# Patient Record
Sex: FEMALE | Race: WHITE | NOT HISPANIC OR LATINO | Employment: OTHER | URBAN - METROPOLITAN AREA
[De-identification: names, ages, dates, MRNs, and addresses within clinical notes are randomized per-mention and may not be internally consistent; named-entity substitution may affect disease eponyms.]

---

## 2017-08-12 ENCOUNTER — PATIENT MESSAGE (OUTPATIENT)
Dept: FAMILY MEDICINE | Facility: CLINIC | Age: 62
End: 2017-08-12

## 2017-10-04 ENCOUNTER — HOSPITAL ENCOUNTER (OUTPATIENT)
Dept: RADIOLOGY | Facility: HOSPITAL | Age: 62
Discharge: HOME | End: 2017-10-04
Attending: OBSTETRICS & GYNECOLOGY
Payer: COMMERCIAL

## 2017-10-04 VITALS — HEIGHT: 63 IN | BODY MASS INDEX: 27.82 KG/M2 | WEIGHT: 157 LBS

## 2017-10-04 DIAGNOSIS — Z12.31 ENCOUNTER FOR SCREENING MAMMOGRAM FOR MALIGNANT NEOPLASM OF BREAST: ICD-10-CM

## 2017-10-04 PROCEDURE — 77063 BREAST TOMOSYNTHESIS BI: CPT | Mod: 26,,, | Performed by: RADIOLOGY

## 2017-10-04 PROCEDURE — 77067 SCR MAMMO BI INCL CAD: CPT | Mod: 26,,, | Performed by: RADIOLOGY

## 2017-10-04 PROCEDURE — 77067 SCR MAMMO BI INCL CAD: CPT | Mod: TC

## 2017-10-10 ENCOUNTER — HOSPITAL ENCOUNTER (OUTPATIENT)
Dept: RADIOLOGY | Facility: HOSPITAL | Age: 62
Discharge: HOME | End: 2017-10-10
Attending: OBSTETRICS & GYNECOLOGY
Payer: COMMERCIAL

## 2017-10-10 DIAGNOSIS — N60.19 FIBROCYSTIC DISEASE OF BREAST: ICD-10-CM

## 2017-10-10 DIAGNOSIS — N63.20 LUMP OF BREAST, LEFT: ICD-10-CM

## 2017-10-10 PROCEDURE — 76642 ULTRASOUND BREAST LIMITED: CPT | Mod: TC,PO,LT

## 2017-10-10 PROCEDURE — 77061 BREAST TOMOSYNTHESIS UNI: CPT | Mod: 26,LT,, | Performed by: RADIOLOGY

## 2017-10-10 PROCEDURE — 77065 DX MAMMO INCL CAD UNI: CPT | Mod: 26,LT,, | Performed by: RADIOLOGY

## 2017-10-10 PROCEDURE — 77061 BREAST TOMOSYNTHESIS UNI: CPT | Mod: TC,LT

## 2017-10-10 PROCEDURE — 76642 ULTRASOUND BREAST LIMITED: CPT | Mod: 26,LT,, | Performed by: RADIOLOGY

## 2017-10-10 PROCEDURE — 77065 DX MAMMO INCL CAD UNI: CPT | Mod: TC,LT

## 2018-04-18 ENCOUNTER — HOSPITAL ENCOUNTER (OUTPATIENT)
Dept: RADIOLOGY | Facility: HOSPITAL | Age: 63
Discharge: HOME | End: 2018-04-18
Attending: SURGERY
Payer: COMMERCIAL

## 2018-04-18 DIAGNOSIS — N63.0 LUMP OR MASS IN BREAST: ICD-10-CM

## 2018-04-18 PROCEDURE — 77061 BREAST TOMOSYNTHESIS UNI: CPT | Mod: 26,LT,, | Performed by: RADIOLOGY

## 2018-04-18 PROCEDURE — 77061 BREAST TOMOSYNTHESIS UNI: CPT | Mod: TC,PO,LT

## 2018-04-18 PROCEDURE — 77065 DX MAMMO INCL CAD UNI: CPT | Mod: 26,LT,, | Performed by: RADIOLOGY

## 2018-04-18 PROCEDURE — 76642 ULTRASOUND BREAST LIMITED: CPT | Mod: TC,PO,LT

## 2018-04-18 PROCEDURE — 76642 ULTRASOUND BREAST LIMITED: CPT | Mod: 26,LT,, | Performed by: RADIOLOGY

## 2018-04-18 PROCEDURE — 77065 DX MAMMO INCL CAD UNI: CPT | Mod: TC,PO,LT

## 2018-10-11 ENCOUNTER — HOSPITAL ENCOUNTER (OUTPATIENT)
Dept: RADIOLOGY | Facility: HOSPITAL | Age: 63
Discharge: HOME | End: 2018-10-11
Attending: SURGERY
Payer: COMMERCIAL

## 2018-10-11 VITALS — WEIGHT: 157 LBS | BODY MASS INDEX: 27.81 KG/M2

## 2018-10-11 DIAGNOSIS — R92.8 ABNORMAL MAMMOGRAM: ICD-10-CM

## 2018-10-11 DIAGNOSIS — Z12.31 SCREENING MAMMOGRAM, ENCOUNTER FOR: ICD-10-CM

## 2018-10-11 PROCEDURE — 77062 BREAST TOMOSYNTHESIS BI: CPT | Mod: 26,,, | Performed by: RADIOLOGY

## 2018-10-11 PROCEDURE — 77062 BREAST TOMOSYNTHESIS BI: CPT | Mod: TC,PO

## 2018-10-11 PROCEDURE — 76642 ULTRASOUND BREAST LIMITED: CPT | Mod: 26,LT,, | Performed by: RADIOLOGY

## 2018-10-11 PROCEDURE — 76642 ULTRASOUND BREAST LIMITED: CPT | Mod: TC,PO,LT

## 2018-10-11 PROCEDURE — 77066 DX MAMMO INCL CAD BI: CPT | Mod: 26,,, | Performed by: RADIOLOGY

## 2018-12-18 PROBLEM — R07.9 CHEST PAIN: Status: ACTIVE | Noted: 2018-12-18

## 2018-12-18 PROBLEM — R79.89 ELEVATED TROPONIN: Status: ACTIVE | Noted: 2018-12-18

## 2018-12-18 PROBLEM — I24.9 ACUTE CORONARY SYNDROME: Status: ACTIVE | Noted: 2018-12-18

## 2018-12-19 PROBLEM — I21.4 NSTEMI (NON-ST ELEVATED MYOCARDIAL INFARCTION): Status: ACTIVE | Noted: 2018-12-19

## 2018-12-19 PROBLEM — I25.10 CORONARY ARTERY DISEASE INVOLVING NATIVE CORONARY ARTERY: Status: ACTIVE | Noted: 2018-12-19

## 2018-12-31 PROBLEM — N94.89 PELVIC HEMATOMA IN FEMALE: Status: ACTIVE | Noted: 2018-12-31

## 2019-02-14 PROBLEM — I25.84 CORONARY ARTERY DISEASE DUE TO CALCIFIED CORONARY LESION: Status: ACTIVE | Noted: 2018-12-19

## 2019-08-14 PROBLEM — I24.9 ACUTE CORONARY SYNDROME: Status: RESOLVED | Noted: 2018-12-18 | Resolved: 2019-08-14

## 2019-10-29 ENCOUNTER — HOSPITAL ENCOUNTER (OUTPATIENT)
Dept: RADIOLOGY | Facility: HOSPITAL | Age: 64
Discharge: HOME | End: 2019-10-29
Attending: SURGERY
Payer: COMMERCIAL

## 2019-10-29 DIAGNOSIS — Z12.31 BREAST CANCER SCREENING BY MAMMOGRAM: ICD-10-CM

## 2019-10-29 DIAGNOSIS — Z12.39 ENCOUNTER FOR SPECIAL SCREENING EXAMINATION FOR NEOPLASM OF BREAST: ICD-10-CM

## 2019-10-29 PROCEDURE — 77067 SCR MAMMO BI INCL CAD: CPT | Mod: 26,,, | Performed by: RADIOLOGY

## 2019-10-29 PROCEDURE — 77063 BREAST TOMOSYNTHESIS BI: CPT | Mod: TC,PO

## 2019-10-29 PROCEDURE — 77063 BREAST TOMOSYNTHESIS BI: CPT | Mod: 26,,, | Performed by: RADIOLOGY

## 2019-10-29 PROCEDURE — 77067 MAMMO DIGITAL SCREENING BILAT WITH TOMOSYNTHESIS_CAD: ICD-10-PCS | Mod: 26,,, | Performed by: RADIOLOGY

## 2019-10-29 PROCEDURE — 77063 MAMMO DIGITAL SCREENING BILAT WITH TOMOSYNTHESIS_CAD: ICD-10-PCS | Mod: 26,,, | Performed by: RADIOLOGY

## 2020-01-03 PROBLEM — R06.02 SOB (SHORTNESS OF BREATH): Status: ACTIVE | Noted: 2020-01-03

## 2020-01-03 PROBLEM — R94.39 ABNORMAL STRESS TEST: Status: ACTIVE | Noted: 2020-01-03

## 2020-10-07 ENCOUNTER — OFFICE VISIT (OUTPATIENT)
Dept: FAMILY MEDICINE | Facility: CLINIC | Age: 65
End: 2020-10-07
Payer: MEDICARE

## 2020-10-07 VITALS
BODY MASS INDEX: 27.07 KG/M2 | SYSTOLIC BLOOD PRESSURE: 118 MMHG | HEART RATE: 79 BPM | TEMPERATURE: 98 F | OXYGEN SATURATION: 98 % | HEIGHT: 63 IN | WEIGHT: 152.75 LBS | DIASTOLIC BLOOD PRESSURE: 72 MMHG

## 2020-10-07 DIAGNOSIS — I25.84 CORONARY ARTERY DISEASE DUE TO CALCIFIED CORONARY LESION: ICD-10-CM

## 2020-10-07 DIAGNOSIS — R79.89 ELEVATED TSH: ICD-10-CM

## 2020-10-07 DIAGNOSIS — M25.539 PAIN IN WRIST, UNSPECIFIED LATERALITY: ICD-10-CM

## 2020-10-07 DIAGNOSIS — I25.10 CORONARY ARTERY DISEASE DUE TO CALCIFIED CORONARY LESION: ICD-10-CM

## 2020-10-07 DIAGNOSIS — Z78.0 POST-MENOPAUSAL: ICD-10-CM

## 2020-10-07 DIAGNOSIS — E78.00 PURE HYPERCHOLESTEROLEMIA: Primary | ICD-10-CM

## 2020-10-07 DIAGNOSIS — E03.9 HYPOTHYROIDISM, UNSPECIFIED TYPE: ICD-10-CM

## 2020-10-07 PROCEDURE — 99215 OFFICE O/P EST HI 40 MIN: CPT | Mod: PBBFAC,PO | Performed by: INTERNAL MEDICINE

## 2020-10-07 PROCEDURE — G0009 ADMIN PNEUMOCOCCAL VACCINE: HCPCS | Mod: PBBFAC,PO

## 2020-10-07 PROCEDURE — 99203 PR OFFICE/OUTPT VISIT, NEW, LEVL III, 30-44 MIN: ICD-10-PCS | Mod: S$PBB,,, | Performed by: INTERNAL MEDICINE

## 2020-10-07 PROCEDURE — 99999 PR PBB SHADOW E&M-EST. PATIENT-LVL V: ICD-10-PCS | Mod: PBBFAC,,, | Performed by: INTERNAL MEDICINE

## 2020-10-07 PROCEDURE — 99999 PR PBB SHADOW E&M-EST. PATIENT-LVL V: CPT | Mod: PBBFAC,,, | Performed by: INTERNAL MEDICINE

## 2020-10-07 PROCEDURE — 99203 OFFICE O/P NEW LOW 30 MIN: CPT | Mod: S$PBB,,, | Performed by: INTERNAL MEDICINE

## 2020-10-07 RX ORDER — LEVOTHYROXINE SODIUM 25 UG/1
25 TABLET ORAL
Qty: 30 TABLET | Refills: 11 | Status: SHIPPED | OUTPATIENT
Start: 2020-10-07 | End: 2021-09-21

## 2020-10-07 RX ORDER — TRIAMCINOLONE ACETONIDE 1 MG/G
CREAM TOPICAL 2 TIMES DAILY PRN
COMMUNITY
Start: 2020-09-30 | End: 2022-11-15

## 2020-10-07 NOTE — PROGRESS NOTES
Subjective:       Patient ID: Mery Krueger is a 65 y.o. female.    Chief Complaint: Establish Care    Pt here to get established. She has been having joint pains for a while.  Today she is mainly having pain in right wrist.  This has been going on for 6 months. No swelling. She is retired so is not on the computre as much as she used to be. She had pos COLLIN 1 : 40 last month but ESR was only 2.  She complanis weight gain and fatigue. Her last TSH was 3.2 She has CAD s/p several stents and is following with cardiology. She is on plavix, crestor, and zetia.  She takse coq10 but has not noticed improvement in joint pains.  She is utd mmg and colonosconpy. Due dexa and pneumonia vaccines.     Review of Systems   Constitutional: Negative for activity change and unexpected weight change.   HENT: Negative for hearing loss, rhinorrhea and trouble swallowing.    Eyes: Negative for discharge and visual disturbance.   Respiratory: Negative for chest tightness and wheezing.    Cardiovascular: Negative for chest pain and palpitations.   Gastrointestinal: Negative for blood in stool, constipation, diarrhea and vomiting.   Endocrine: Negative for polydipsia and polyuria.   Genitourinary: Negative for difficulty urinating, dysuria, hematuria and menstrual problem.   Musculoskeletal: Positive for arthralgias. Negative for joint swelling and neck pain.   Neurological: Negative for weakness and headaches.   Psychiatric/Behavioral: Negative for confusion and dysphoric mood.         Objective:      Physical Exam  Constitutional:       Appearance: She is well-developed.   HENT:      Head: Normocephalic and atraumatic.   Eyes:      Conjunctiva/sclera: Conjunctivae normal.      Pupils: Pupils are equal, round, and reactive to light.   Neck:      Musculoskeletal: Normal range of motion and neck supple.      Thyroid: No thyromegaly.   Cardiovascular:      Rate and Rhythm: Normal rate and regular rhythm.      Heart sounds: Normal heart  sounds. No murmur. No friction rub. No gallop.    Pulmonary:      Effort: Pulmonary effort is normal. No respiratory distress.      Breath sounds: Normal breath sounds. No wheezing or rales.   Abdominal:      General: Bowel sounds are normal. There is no distension.      Palpations: Abdomen is soft.      Tenderness: There is no abdominal tenderness.   Musculoskeletal: Normal range of motion.   Lymphadenopathy:      Cervical: No cervical adenopathy.   Skin:     General: Skin is warm and dry.   Neurological:      Mental Status: She is alert and oriented to person, place, and time.      Cranial Nerves: No cranial nerve deficit.   Psychiatric:         Behavior: Behavior normal.         Assessment:       1. Pure hypercholesterolemia    2. Coronary artery disease, Stents LAD 12/2018 [place] h/o acute stent thrombosis.    3. Post-menopausal    4. Elevated TSH    5. Hypothyroidism, unspecified type    6. Pain in wrist, unspecified laterality        Plan:       wrist pain- referred to ortho     Cad- stable. Following with cardiolgy  Joint pains- pricila very low titer and esr 2 so I do not think she has autoimmune cause. She does have appt with rheum in [place].  Joint pains likely from high dose statin and zeita. LDL in the 30s- she will discuss possible decrease in medication with her cardiologist  tsh 3.2 : will try low dose synthroid 25 to see if symtpoms fatigue, weight gain improve.  Will recheck labs 4-6 weeks  Check dexa.  prevnar today

## 2020-10-13 DIAGNOSIS — M25.531 RIGHT WRIST PAIN: Primary | ICD-10-CM

## 2020-10-16 ENCOUNTER — HOSPITAL ENCOUNTER (OUTPATIENT)
Dept: RADIOLOGY | Facility: HOSPITAL | Age: 65
Discharge: HOME | End: 2020-10-16
Attending: ORTHOPAEDIC SURGERY
Payer: MEDICARE

## 2020-10-16 ENCOUNTER — OFFICE VISIT (OUTPATIENT)
Dept: ORTHOPEDICS | Facility: CLINIC | Age: 65
End: 2020-10-16
Payer: MEDICARE

## 2020-10-16 VITALS — HEIGHT: 63 IN | WEIGHT: 152.75 LBS | BODY MASS INDEX: 27.07 KG/M2

## 2020-10-16 DIAGNOSIS — M25.539 PAIN IN WRIST, UNSPECIFIED LATERALITY: ICD-10-CM

## 2020-10-16 DIAGNOSIS — M25.531 RIGHT WRIST PAIN: ICD-10-CM

## 2020-10-16 DIAGNOSIS — M19.031 ARTHRITIS OF RIGHT WRIST: Primary | ICD-10-CM

## 2020-10-16 PROCEDURE — 99203 PR OFFICE/OUTPT VISIT, NEW, LEVL III, 30-44 MIN: ICD-10-PCS | Mod: 25,S$PBB,, | Performed by: ORTHOPAEDIC SURGERY

## 2020-10-16 PROCEDURE — 99203 OFFICE O/P NEW LOW 30 MIN: CPT | Mod: 25,S$PBB,, | Performed by: ORTHOPAEDIC SURGERY

## 2020-10-16 PROCEDURE — 99999 PR PBB SHADOW E&M-EST. PATIENT-LVL IV: ICD-10-PCS | Mod: PBBFAC,,, | Performed by: ORTHOPAEDIC SURGERY

## 2020-10-16 PROCEDURE — 99999 PR PBB SHADOW E&M-EST. PATIENT-LVL IV: CPT | Mod: PBBFAC,,, | Performed by: ORTHOPAEDIC SURGERY

## 2020-10-16 PROCEDURE — 97760 ORTHOTIC MGMT&TRAING 1ST ENC: CPT | Mod: GP,S$PBB,, | Performed by: ORTHOPAEDIC SURGERY

## 2020-10-16 PROCEDURE — 73110 X-RAY EXAM OF WRIST: CPT | Mod: 26,RT,, | Performed by: RADIOLOGY

## 2020-10-16 PROCEDURE — 97760 PR ORTHOTIC MGMT&TRAINJ INITIAL ENC EA 15 MINS: ICD-10-PCS | Mod: GP,S$PBB,, | Performed by: ORTHOPAEDIC SURGERY

## 2020-10-16 PROCEDURE — 99214 OFFICE O/P EST MOD 30 MIN: CPT | Mod: PBBFAC,25,PN | Performed by: ORTHOPAEDIC SURGERY

## 2020-10-16 PROCEDURE — 73110 X-RAY EXAM OF WRIST: CPT | Mod: TC,PO,RT

## 2020-10-16 PROCEDURE — 73110 XR WRIST COMPLETE 3 VIEWS RIGHT: ICD-10-PCS | Mod: 26,RT,, | Performed by: RADIOLOGY

## 2020-10-16 NOTE — LETTER
October 16, 2020      Teri Magallon MD  [address] [facility] [address] [place] [address]           [facility] [place]  [address] [place] [address]  Phone: [phone]          Patient: Mery Krueger   MR Number: [id number]   YOB: 1955   Date of Visit: 10/16/2020       Dear Dr. Teri Magallon:    Thank you for referring Mery Krueger to me for evaluation. Attached you will find relevant portions of my assessment and plan of care.    If you have questions, please do not hesitate to call me. I look forward to following Mery Krueger along with you.    Sincerely,    Jermaine Abel MD    Enclosure  CC:  No Recipients    If you would like to receive this communication electronically, please contact [email or web address] or [phone] to request more information on [facility] access.    For providers and/or their staff who would like to refer a patient to [facility], please contact us through our one-stop-shop provider referral line, [facility], at [phone].    If you feel you have received this communication in error or would no longer like to receive these types of communications, please e-mail [email or web address]

## 2020-10-16 NOTE — PROGRESS NOTES
65 years old complaining of right wrist pain off and on for the past 4 months.  Pain is 2 on good days, 3 on bad days.  Most noted with activity relieved with rest.  No one time traumatic event aching type pain difficulty picking things does not report numbness or tingling    Exam shows no signs infection, good motion and strength, skin is intact flexors and extensors are intact    X-rays show degenerative changes    Assessment:  Right wrist arthrosis    Plan:  Wrist brace, symptomatic care, follow-up as needed    We performed a custom orthotic/brace fitting, adjusting and training with the patient. The patient demonstrated understanding and proper care. This was performed for 15 minutes.    Further History  Aching pain  Worse with activity  Relieved with rest  No other associated symptoms  No other radiation    Further Exam  Alert and oriented  Pleasant  Contralateral limb has appropriate range of motion for age and condition  Contralateral limb has appropriate strength for age and condition  Contralateral limb has appropriate stability  for age and condition  No adenopathy  Pulses are appropriate for current condition  Skin is intact        Chief Complaint    Chief Complaint   Patient presents with    Right Wrist - Pain       HPI  Mery Krueger is a 65 y.o.  female who presents with       Past Medical History  Past Medical History:   Diagnosis Date    Arthritis     Asthma     GERD (gastroesophageal reflux disease)     Hyperlipidemia        Past Surgical History  Past Surgical History:   Procedure Laterality Date    ABDOMINAL SURGERY      ANKLE FRACTURE SURGERY      CARDIAC CATHETERIZATION       SECTION      CORONARY ANGIOGRAPHY Left 1/3/2020    Procedure: ANGIOGRAM, CORONARY ARTERY;  Surgeon: Aleks Vera MD;  Location: [facility];  Service: Cardiology;  Laterality: Left;    FRACTURE SURGERY      LEFT HEART CATHETERIZATION Left 2018    Procedure: CATHETERIZATION, HEART, LEFT;   Surgeon: Mark Franco MD;  Location: [facility] CATH;  Service: Cardiology;  Laterality: Left;    LEFT HEART CATHETERIZATION Left 1/3/2020    Procedure: Left heart cath;  Surgeon: Aleks Vera MD;  Location: [facility] CATH;  Service: Cardiology;  Laterality: Left;    PERCUTANEOUS TRANSLUMINAL BALLOON ANGIOPLASTY OF CORONARY ARTERY  12/19/2018    Procedure: Angioplasty-coronary;  Surgeon: Mark Franco MD;  Location: [facility] CATH;  Service: Cardiology;;    SHOULDER ARTHROSCOPY         Medications  Current Outpatient Medications   Medication Sig    albuterol (ACCUNEB) 1.25 mg/3 mL Nebu Take 6 mLs (2.5 mg total) by nebulization every 6 (six) hours as needed.    aspirin (ECOTRIN) 81 MG EC tablet Take 81 mg by mouth once daily.    azelastine (ASTELIN) 137 mcg (0.1 %) nasal spray 1 spray by Nasal route 2 (two) times daily as needed.     clopidogreL (PLAVIX) 75 mg tablet Take 1 tablet (75 mg total) by mouth once daily.    escitalopram oxalate (LEXAPRO) 10 MG tablet Take 10 mg by mouth every evening.    ezetimibe (ZETIA) 10 mg tablet TAKE 1 TABLET BY MOUTH EVERY DAY    hydrocortisone 2.5 % cream APPLY TO AFFECTED AREAS TWICE DAILY UP TO 2 WEEKS/MONTH AS NEEDED.    ketoconazole (NIZORAL) 2 % shampoo Apply topically twice a week.     levothyroxine (SYNTHROID) 25 MCG tablet Take 1 tablet (25 mcg total) by mouth before breakfast.    rosuvastatin (CRESTOR) 20 MG tablet Take 1 tablet (20 mg total) by mouth every evening.    triamcinolone acetonide 0.1% (KENALOG) 0.1 % cream 2 (two) times daily as needed. Apply to affected area    UNABLE TO FIND Take 1 tablet by mouth once daily. Q-Evail 100mg     UNABLE TO FIND Take 1 tablet by mouth once daily. LV-GB complex vitamin  Liver- Gallbladder     UNABLE TO FIND every 14 (fourteen) days. Vitamin C injections    zinc 50 mg Tab Take by mouth once daily.     No current facility-administered medications for this visit.        Allergies  Review of patient's allergies indicates:  No  Known Allergies    Family History  Family History   Problem Relation Age of Onset    Heart disease Mother     COPD Father        Social History  Social History     Socioeconomic History    Marital status:      Spouse name: Not on file    Number of children: Not on file    Years of education: Not on file    Highest education level: Not on file   Occupational History    Not on file   Social Needs    Financial resource strain: Not hard at all    Food insecurity     Worry: Never true     Inability: Never true    Transportation needs     Medical: No     Non-medical: No   Tobacco Use    Smoking status: Never Smoker    Smokeless tobacco: Never Used   Substance and Sexual Activity    Alcohol use: Yes     Frequency: 2-3 times a week     Drinks per session: 1 or 2     Binge frequency: Never     Comment: Social    Drug use: No    Sexual activity: Not Currently   Lifestyle    Physical activity     Days per week: 4 days     Minutes per session: 70 min    Stress: Not at all   Relationships    Social connections     Talks on phone: More than three times a week     Gets together: More than three times a week     Attends Nondenominational service: Not on file     Active member of club or organization: No     Attends meetings of clubs or organizations: Never     Relationship status:    Other Topics Concern    Not on file   Social History Narrative    Not on file               Review of Systems     Constitutional: Negative    HENT: Negative  Eyes: Negative  Respiratory: Negative  Cardiovascular: Negative  Musculoskeletal: HPI  Skin: Negative  Neurological: Negative  Hematological: Negative  Endocrine: Negative                 Physical Exam    There were no vitals filed for this visit.  Body mass index is 27.06 kg/m².  Physical Examination:     General appearance -  well appearing, and in no distress  Mental status - awake  Neck - supple  Chest -  symmetric air entry  Heart - normal rate   Abdomen -  soft      Assessment     1. Pain in wrist, unspecified laterality          Plan

## 2020-11-04 ENCOUNTER — HOSPITAL ENCOUNTER (OUTPATIENT)
Dept: RADIOLOGY | Facility: HOSPITAL | Age: 65
Discharge: HOME | End: 2020-11-04
Attending: SURGERY
Payer: MEDICARE

## 2020-11-04 DIAGNOSIS — Z12.31 ENCOUNTER FOR SCREENING MAMMOGRAM FOR MALIGNANT NEOPLASM OF BREAST: ICD-10-CM

## 2020-11-04 PROCEDURE — 77067 MAMMO DIGITAL SCREENING BILAT WITH TOMO: ICD-10-PCS | Mod: 26,,, | Performed by: RADIOLOGY

## 2020-11-04 PROCEDURE — 77063 BREAST TOMOSYNTHESIS BI: CPT | Mod: 26,,, | Performed by: RADIOLOGY

## 2020-11-04 PROCEDURE — 77067 SCR MAMMO BI INCL CAD: CPT | Mod: 26,,, | Performed by: RADIOLOGY

## 2020-11-04 PROCEDURE — 77067 SCR MAMMO BI INCL CAD: CPT | Mod: TC,PO

## 2020-11-04 PROCEDURE — 77063 MAMMO DIGITAL SCREENING BILAT WITH TOMO: ICD-10-PCS | Mod: 26,,, | Performed by: RADIOLOGY

## 2020-11-09 ENCOUNTER — HOSPITAL ENCOUNTER (OUTPATIENT)
Dept: RADIOLOGY | Facility: HOSPITAL | Age: 65
Discharge: HOME | End: 2020-11-09
Attending: INTERNAL MEDICINE
Payer: MEDICARE

## 2020-11-09 DIAGNOSIS — Z78.0 POST-MENOPAUSAL: ICD-10-CM

## 2020-11-09 PROCEDURE — 77080 DXA BONE DENSITY AXIAL: CPT | Mod: TC,PO

## 2020-11-09 PROCEDURE — 77080 DXA BONE DENSITY AXIAL: CPT | Mod: 26,,, | Performed by: RADIOLOGY

## 2020-11-09 PROCEDURE — 77080 DEXA BONE DENSITY SPINE HIP: ICD-10-PCS | Mod: 26,,, | Performed by: RADIOLOGY

## 2020-12-23 NOTE — PROGRESS NOTES
RHEUMATOLOGY CLINIC INITIAL VISIT    Reason for referral:-  Referred for evaluation of arthralgia      Chief complaints:- L elbow pain       HPI:-  Mery Parekh a 65 y.o. pleasant female with PMH of Asthma, HLPD, GERD comes in for an initial visit with above chief complaints.     Patient had seen PCP in Oct with complain of joint pain since early .  No swelling.  Had +COLLIN in the past? (1:40).      Patient complaining of L elbow pain that started about a year ago.  Pain comes and goes. Pain is worse with movement - pain is sharp in nature with no radiation.  Happens more frequently in the PM - wakes her up in the evening.  Advil/Motrin alleviates the symptom.  Concern about taking too much due to elevated LFTs in the past.     AM stiffness - all over (last for 5 mins).  Walk 3-4 miles/day - no pain.  Inactivity causes stiffness and pain all over.  No joint swelling. No trauma prior to the onset of symptoms.      Fhx: no rheumatological, thyroid, or IBD.  No psoriasis.     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     Review of Systems   Constitutional: Negative for chills, diaphoresis, fever, malaise/fatigue and weight loss.   HENT: Negative for congestion, ear discharge, ear pain, hearing loss, nosebleeds, sinus pain and tinnitus.    Eyes: Negative for photophobia, pain, discharge and redness.   Respiratory: Positive for shortness of  breath. Negative for cough, hemoptysis, sputum production, wheezing and stridor.    Cardiovascular: Negative for chest pain, palpitations, orthopnea, claudication, leg swelling and PND.   Gastrointestinal: Negative for abdominal pain, constipation, diarrhea, heartburn, nausea and vomiting.   Genitourinary: Negative for dysuria, frequency, hematuria and urgency.   Musculoskeletal: Negative for back pain, joint pain, myalgias and neck pain.   Skin: Negative for rash.   Neurological: Positive for weakness and headaches. Negative for dizziness, tingling and tremors.   Endo/Heme/Allergies: Does not bruise/bleed easily.   Psychiatric/Behavioral: Negative for depression, hallucinations and suicidal ideas. The patient is not nervous/anxious and does not have insomnia.        Past Medical History:   Diagnosis Date    Arthritis     Asthma     GERD (gastroesophageal reflux disease)     Hyperlipidemia        Past Surgical History:   Procedure Laterality Date    ABDOMINAL SURGERY      ANKLE FRACTURE SURGERY      CARDIAC CATHETERIZATION       SECTION      CORONARY ANGIOGRAPHY Left 1/3/2020    Procedure: ANGIOGRAM, CORONARY ARTERY;  Surgeon: Aleks Vera MD;  Location: [facility] CATH;  Service: Cardiology;  Laterality: Left;    FRACTURE SURGERY      LEFT HEART CATHETERIZATION Left 2018    Procedure: CATHETERIZATION, HEART, LEFT;  Surgeon: Mark Franco MD;  Location: [facility] CATH;  Service: Cardiology;  Laterality: Left;    LEFT HEART CATHETERIZATION Left 1/3/2020    Procedure: Left heart cath;  Surgeon: Aleks Vera MD;  Location: [facility] CATH;  Service: Cardiology;  Laterality: Left;    PERCUTANEOUS TRANSLUMINAL BALLOON ANGIOPLASTY OF CORONARY ARTERY  2018    Procedure: Angioplasty-coronary;  Surgeon: Mark Franco MD;  Location: [facility] CATH;  Service: Cardiology;;    SHOULDER ARTHROSCOPY          Social History     Tobacco Use    Smoking status: Never Smoker    Smokeless tobacco: Never Used   Substance  "Use Topics    Alcohol use: Yes     Frequency: 2-3 times a week     Drinks per session: 1 or 2     Binge frequency: Never     Comment: Social    Drug use: No       Family History   Problem Relation Age of Onset    Heart disease Mother     COPD Father        Review of patient's allergies indicates:  No Known Allergies    Vitals:    12/29/20 1030   BP: 131/79   Pulse: 69   Weight: 65.5 kg (144 lb 6.4 oz)   Height: 5' 3" (1.6 m)   PainSc:   4       Physical Exam   Constitutional: She is oriented to person, place, and time and well-developed, well-nourished, and in no distress.   HENT:   Head: Normocephalic and atraumatic.   Eyes: Pupils are equal, round, and reactive to light. Conjunctivae are normal.   Neck: Normal range of motion. Neck supple.   Cardiovascular: Normal rate, regular rhythm and normal heart sounds.   Pulmonary/Chest: Effort normal and breath sounds normal.   Abdominal: Soft. Bowel sounds are normal.   Musculoskeletal: Normal range of motion.      Comments: R tinel's and phalen's sign   Tenderness of the L lateral epicondyle with touch  R 2nd digit swelling - no redness (previous fracture)  L 1st and 3rd digit swelling   ROM and strength intact    Neurological: She is alert and oriented to person, place, and time. Gait normal.   Tenderness of the L anterior medial leg (right above the ankle)   Skin: Skin is warm and dry.   Dried red skin under the L breast, L elbow, and L lower leg  No nail changes   Psychiatric: Mood, memory, affect and judgment normal.       Labs:-  Results for OREN SOL (MRN [id number]) as of 12/23/2020 10:48   Ref. Range 8/2/2019 10:46 1/3/2020 11:27 2/6/2020 09:10 8/6/2020 10:44 11/9/2020 09:25   WBC Latest Ref Range: 3.90 - 12.70 K/uL 5.39 5.74 5.31 4.72    RBC Latest Ref Range: 4.00 - 5.40 M/uL 4.26 4.54 4.39 4.46    Hemoglobin Latest Ref Range: 12.0 - 16.0 g/dL 12.8 13.6 13.2 13.3    Hematocrit Latest Ref Range: 37.0 - 48.5 % 38.8 41.7 40.4 40.3    MCV Latest Ref " Range: 82 - 98 fL 91 92 92 90    MCH Latest Ref Range: 27.0 - 31.0 pg 30.0 30.0 30.1 29.8    MCHC Latest Ref Range: 32.0 - 36.0 g/dL 33.0 32.6 32.7 33.0    RDW Latest Ref Range: 11.5 - 14.5 % 13.5 13.2 12.9 13.1    Platelets Latest Ref Range: 150 - 350 K/uL 173 172 180 174    MPV Latest Ref Range: 9.2 - 12.9 fL 11.1 10.3 10.9 10.8    Gran % Latest Ref Range: 38.0 - 73.0 % 72.2 76.7 (H)      Lymph % Latest Ref Range: 18.0 - 48.0 % 17.3 (L) 13.1 (L)      Mono % Latest Ref Range: 4.0 - 15.0 % 8.0 7.7      Eosinophil % Latest Ref Range: 0.0 - 8.0 % 1.7 1.7      Basophil % Latest Ref Range: 0.0 - 1.9 % 0.2 0.3      Immature Granulocytes Latest Ref Range: 0.0 - 0.5 % 0.6 (H) 0.5      Gran # (ANC) Latest Ref Range: 1.8 - 7.7 K/uL 3.9 4.4      Lymph # Latest Ref Range: 1.0 - 4.8 K/uL 0.9 (L) 0.8 (L)      Mono # Latest Ref Range: 0.3 - 1.0 K/uL 0.4 0.4      Eos # Latest Ref Range: 0.0 - 0.5 K/uL 0.1 0.1      Baso # Latest Ref Range: 0.00 - 0.20 K/uL 0.01 0.02      Immature Grans (Abs) Latest Ref Range: 0.00 - 0.04 K/uL 0.03 0.03      nRBC Latest Ref Range: 0 /100 WBC 0 0      Differential Method Unknown Automated Automated      Sodium Latest Ref Range: 136 - 145 mmol/L 139 139 140 138    Potassium Latest Ref Range: 3.5 - 5.1 mmol/L 4.1 4.0 3.9 4.0    Chloride Latest Ref Range: 95 - 110 mmol/L 106 108 106 107    CO2 Latest Ref Range: 22 - 31 mmol/L 27 25 26 26    Anion Gap Latest Ref Range: 8 - 16 mmol/L 6 (L) 6 (L) 8 5 (L)    BUN Latest Ref Range: 7 - 18 mg/dL 18 21 (H) 26 (H) 21 (H)    Creatinine Latest Ref Range: 0.50 - 1.40 mg/dL 0.53 0.53 0.69 0.61    eGFR if non African American Latest Ref Range: >60 mL/min/1.73 m^2 >60 >60 >60 >60    eGFR if  Latest Ref Range: >60 mL/min/1.73 m^2 >60 >60 >60 >60    Glucose Latest Ref Range: 70 - 110 mg/dL 99 102 106 103    Calcium Latest Ref Range: 8.4 - 10.2 mg/dL 8.9 9.0 9.0 9.2    Magnesium Latest Ref Range: 1.6 - 2.6 mg/dL  2.2      Alkaline Phosphatase Latest Ref  Range: 38 - 145 U/L 76 68 74 93    PROTEIN TOTAL Latest Ref Range: 6.0 - 8.4 g/dL 5.8 (L) 6.8 6.0 6.4    Albumin Latest Ref Range: 3.5 - 5.2 g/dL 3.6 4.1 3.7 4.0    BILIRUBIN TOTAL Latest Ref Range: 0.2 - 1.3 mg/dL 0.9 1.2 1.0 1.0    AST Latest Ref Range: 14 - 36 U/L 39 (H) 46 (H) 38 (H) 53 (H)    ALT Latest Ref Range: 0 - 35 U/L 50 (H) 50 (H) 35 45 (H)    Triglycerides Latest Ref Range: 30 - 150 mg/dL 49  51 52    Cholesterol Latest Ref Range: 120 - 199 mg/dL 99 (L)  106 (L) 109 (L)    HDL Latest Ref Range: 40 - 75 mg/dL 52  54 65    HDL/Cholesterol Ratio Latest Ref Range: 20.0 - 50.0 % 52.5 (H)  50.9 (H) 59.6 (H)    LDL Cholesterol External Latest Ref Range: 63.0 - 159.0 mg/dL 37.2 (L)  41.8 (L) 33.6 (L)    Non-HDL Cholesterol Latest Units: mg/dL 47  52 44    Total Cholesterol/HDL Ratio Latest Ref Range: 2.0 - 5.0  1.9 (L)  2.0 1.7 (L)    CPK Latest Ref Range: 55 - 170 U/L   76 97    NT-proBNP Latest Ref Range: 5 - 900 pg/mL  39      Troponin I Latest Ref Range: 0.012 - 0.034 ng/mL  <0.012      TSH Latest Ref Range: 0.400 - 4.000 uIU/mL  3.040 3.190 2.970 1.335   Free T4 Latest Ref Range: 0.71 - 1.51 ng/dL  0.75 (L)   0.92   Specimen UA Unknown  Urine, Clean Catch      Color, UA Latest Ref Range: Yellow, Straw, [place]   Yellow      Appearance, UA Latest Ref Range: Clear   Clear      Specific [place], UA Latest Ref Range: 1.005 - 1.030   1.020      pH, UA Latest Ref Range: 5.0 - 8.0   8.0      Protein, UA Latest Ref Range: Negative   Negative      Glucose, UA Latest Ref Range: Negative   Negative      Ketones, UA Latest Ref Range: Negative   Negative      Occult Blood UA Latest Ref Range: Negative   Negative      NITRITE UA Latest Ref Range: Negative   Negative      UROBILINOGEN UA Latest Ref Range: <2.0 EU/dL  0.2      Bilirubin (UA) Latest Ref Range: Negative   Negative      Leukocytes, UA Latest Ref Range: Negative   1+ (A)      RBC, UA Latest Ref Range: 0 - 4 /hpf  1      WBC, UA Latest Ref Range: 0 - 5 /hpf  2       Bacteria, UA Latest Ref Range: None-Occ /hpf  Negative      Squam Epithel, UA Latest Units: /hpf  1      Hyaline Casts, UA Latest Ref Range: 0 - 1 /lpf  2 (A)        Radiographs:-  Independent visualization of images done.  October 2020 - right wrist - degenerate changes  November 2020 - DEXA scan - lumbar spine T score -0.7, L femoral neck T score -1.1, and Total hip t score -0.5.  FRAX - 25% risk of osteoporotic fracture and 1.3% risk of hip fracture in the next 10 years. Osteopenia     Old and Outside medical records :-  Reviewed old and all outside medical records available in Care Everywhere.  Aug 2020 - outside labs    CMP - normal    Phosphate - elevated at 4.4 (limit 4.3)    COLLIN 1:40H - dense fine speckled    Anti Smooth Muscle Ab - + 1:40   Lipid panel - normal   Vit D 42   Complements - normal     Uric acid 3.7  ESR - normal   CBC - normal   dsDNA - negative  SSA/SSB - normal   RF/CCP - negative   CRP - normal   Thyroid function - normal     Medication List with Changes/Refills   Current Medications    ALBUTEROL (ACCUNEB) 1.25 MG/3 ML NEBU    Take 6 mLs (2.5 mg total) by nebulization every 6 (six) hours as needed.    AZELASTINE (ASTELIN) 137 MCG (0.1 %) NASAL SPRAY    1 spray by Nasal route 2 (two) times daily as needed.     CLOPIDOGREL (PLAVIX) 75 MG TABLET    Take 1 tablet (75 mg total) by mouth once daily.    ESCITALOPRAM OXALATE (LEXAPRO) 10 MG TABLET    Take 10 mg by mouth every evening.    EZETIMIBE (ZETIA) 10 MG TABLET    TAKE 1 TABLET BY MOUTH EVERY DAY    FAMOTIDINE (PEPCID) 40 MG TABLET    Take 40 mg by mouth once daily.    HYDROCORTISONE 2.5 % CREAM    APPLY TO AFFECTED AREAS TWICE DAILY UP TO 2 WEEKS/MONTH AS NEEDED.    KETOCONAZOLE (NIZORAL) 2 % SHAMPOO    Apply topically twice a week.     LEVOTHYROXINE (SYNTHROID) 25 MCG TABLET    Take 1 tablet (25 mcg total) by mouth before breakfast.    ROSUVASTATIN (CRESTOR) 20 MG TABLET    Take 1 tablet (20 mg total) by mouth every evening.     TRIAMCINOLONE ACETONIDE 0.1% (KENALOG) 0.1 % CREAM    2 (two) times daily as needed. Apply to affected area    UNABLE TO FIND    Take 1 tablet by mouth once daily. Q-Evail 100mg     UNABLE TO FIND    Take 1 tablet by mouth once daily. LV-GB complex vitamin  Liver- Gallbladder     UNABLE TO FIND    every 14 (fourteen) days. Vitamin C injections    ZINC 50 MG TAB    Take by mouth once daily.       Assessment/Plans:-   65 y.o. pleasant female with PMH of Asthma, HLPD, GERD comes in for an initial visit with with complaint of L elbow pain.     Patient had multiple injuries in the past.  Most recent was a fall with outreached R hand.  Imaging was negative for fractures.     Currently complaining of L elbow discomfort - symptoms of discomfort mainly in the PM and wakes her up.     Labs: outside labs reviewed.  Unremarkable.  Normal COLLIN.  Negative RF/CCP    Arthralgia - concerning of nerve compression - ulnar/carpal tunnel.      Joint swelling (asymptomatic) in bilateral feet - s/p trauma?      Plan  - xrays of bilateral elbows  - EMG - 4 extremities   - xray of bilateral feet  - recommendation to wear elbow (L) and R wrist splint in the evening and when doing repetitive motion   - continue daily exercising   - rash - keep it moist - avoid prolong hot showers.  Lotion immediately after  - decrease EtOH intake - hx of elevated LFTs  - use tylenol with caution due to elevated LFTs     Follow up in about 6 weeks (around 2/9/2021).    Thank you for allowing me to participate in the care ofMery Krueger.    Disclaimer: This note was prepared using voice recognition system and is likely to have sound alike errors and is not proof read.  Please call me with any questions.

## 2020-12-29 ENCOUNTER — OFFICE VISIT (OUTPATIENT)
Dept: RHEUMATOLOGY | Facility: CLINIC | Age: 65
End: 2020-12-29
Payer: MEDICARE

## 2020-12-29 VITALS
SYSTOLIC BLOOD PRESSURE: 131 MMHG | HEART RATE: 69 BPM | HEIGHT: 63 IN | DIASTOLIC BLOOD PRESSURE: 79 MMHG | BODY MASS INDEX: 25.58 KG/M2 | WEIGHT: 144.38 LBS

## 2020-12-29 DIAGNOSIS — M19.90 OSTEOARTHRITIS, UNSPECIFIED OSTEOARTHRITIS TYPE, UNSPECIFIED SITE: ICD-10-CM

## 2020-12-29 DIAGNOSIS — M72.2 PLANTAR FASCIITIS: ICD-10-CM

## 2020-12-29 DIAGNOSIS — M25.50 ARTHRALGIA, UNSPECIFIED JOINT: Primary | ICD-10-CM

## 2020-12-29 DIAGNOSIS — G56.00 CARPAL TUNNEL SYNDROME, UNSPECIFIED LATERALITY: ICD-10-CM

## 2020-12-29 DIAGNOSIS — M79.672 FOOT PAIN, LEFT: ICD-10-CM

## 2020-12-29 PROCEDURE — 99999 PR PBB SHADOW E&M-EST. PATIENT-LVL IV: CPT | Mod: PBBFAC,,, | Performed by: STUDENT IN AN ORGANIZED HEALTH CARE EDUCATION/TRAINING PROGRAM

## 2020-12-29 PROCEDURE — 99204 OFFICE O/P NEW MOD 45 MIN: CPT | Mod: S$PBB,,, | Performed by: STUDENT IN AN ORGANIZED HEALTH CARE EDUCATION/TRAINING PROGRAM

## 2020-12-29 PROCEDURE — 99204 PR OFFICE/OUTPT VISIT, NEW, LEVL IV, 45-59 MIN: ICD-10-PCS | Mod: S$PBB,,, | Performed by: STUDENT IN AN ORGANIZED HEALTH CARE EDUCATION/TRAINING PROGRAM

## 2020-12-29 PROCEDURE — 99214 OFFICE O/P EST MOD 30 MIN: CPT | Mod: PBBFAC,PO | Performed by: STUDENT IN AN ORGANIZED HEALTH CARE EDUCATION/TRAINING PROGRAM

## 2020-12-29 PROCEDURE — 99999 PR PBB SHADOW E&M-EST. PATIENT-LVL IV: ICD-10-PCS | Mod: PBBFAC,,, | Performed by: STUDENT IN AN ORGANIZED HEALTH CARE EDUCATION/TRAINING PROGRAM

## 2020-12-29 RX ORDER — FAMOTIDINE 40 MG/1
40 TABLET, FILM COATED ORAL DAILY
COMMUNITY
Start: 2020-10-29 | End: 2021-08-24

## 2021-01-11 ENCOUNTER — TELEPHONE (OUTPATIENT)
Dept: PAIN MEDICINE | Facility: CLINIC | Age: 66
End: 2021-01-11

## 2021-01-26 ENCOUNTER — PROCEDURE VISIT (OUTPATIENT)
Dept: PHYSICAL MEDICINE AND REHAB | Facility: CLINIC | Age: 66
End: 2021-01-26
Payer: MEDICARE

## 2021-01-26 DIAGNOSIS — M25.50 ARTHRALGIA, UNSPECIFIED JOINT: ICD-10-CM

## 2021-01-26 DIAGNOSIS — M79.2 NEURALGIA: ICD-10-CM

## 2021-01-26 DIAGNOSIS — M79.672 FOOT PAIN, LEFT: ICD-10-CM

## 2021-01-26 DIAGNOSIS — M19.90 OSTEOARTHRITIS, UNSPECIFIED OSTEOARTHRITIS TYPE, UNSPECIFIED SITE: ICD-10-CM

## 2021-01-26 PROCEDURE — 95913 NRV CNDJ TEST 13/> STUDIES: CPT | Mod: 26,S$PBB,, | Performed by: PHYSICAL MEDICINE & REHABILITATION

## 2021-01-26 PROCEDURE — 95886 MUSC TEST DONE W/N TEST COMP: CPT | Mod: PBBFAC,PN | Performed by: PHYSICAL MEDICINE & REHABILITATION

## 2021-01-26 PROCEDURE — 95886 MUSC TEST DONE W/N TEST COMP: CPT | Mod: 26,S$PBB,, | Performed by: PHYSICAL MEDICINE & REHABILITATION

## 2021-01-26 PROCEDURE — 95913 PR NERVE CONDUCTION STUDY; 13 OR MORE STUDIES: ICD-10-PCS | Mod: 26,S$PBB,, | Performed by: PHYSICAL MEDICINE & REHABILITATION

## 2021-01-26 PROCEDURE — 95913 NRV CNDJ TEST 13/> STUDIES: CPT | Mod: PBBFAC,PN | Performed by: PHYSICAL MEDICINE & REHABILITATION

## 2021-01-26 PROCEDURE — 95886 PR EMG COMPLETE, W/ NERVE CONDUCTION STUDIES, 5+ MUSCLES: ICD-10-PCS | Mod: 26,S$PBB,, | Performed by: PHYSICAL MEDICINE & REHABILITATION

## 2021-02-24 ENCOUNTER — OFFICE VISIT (OUTPATIENT)
Dept: RHEUMATOLOGY | Facility: CLINIC | Age: 66
End: 2021-02-24
Payer: MEDICARE

## 2021-02-24 VITALS
WEIGHT: 142 LBS | BODY MASS INDEX: 25.16 KG/M2 | HEIGHT: 63 IN | HEART RATE: 80 BPM | DIASTOLIC BLOOD PRESSURE: 71 MMHG | SYSTOLIC BLOOD PRESSURE: 124 MMHG

## 2021-02-24 DIAGNOSIS — M72.2 PLANTAR FASCIITIS: ICD-10-CM

## 2021-02-24 DIAGNOSIS — M25.50 ARTHRALGIA, UNSPECIFIED JOINT: Primary | ICD-10-CM

## 2021-02-24 DIAGNOSIS — M79.672 FOOT PAIN, LEFT: ICD-10-CM

## 2021-02-24 DIAGNOSIS — G56.00 CARPAL TUNNEL SYNDROME, UNSPECIFIED LATERALITY: ICD-10-CM

## 2021-02-24 DIAGNOSIS — M19.90 OSTEOARTHRITIS, UNSPECIFIED OSTEOARTHRITIS TYPE, UNSPECIFIED SITE: ICD-10-CM

## 2021-02-24 PROCEDURE — 99999 PR PBB SHADOW E&M-EST. PATIENT-LVL IV: CPT | Mod: PBBFAC,,, | Performed by: STUDENT IN AN ORGANIZED HEALTH CARE EDUCATION/TRAINING PROGRAM

## 2021-02-24 PROCEDURE — 99999 PR PBB SHADOW E&M-EST. PATIENT-LVL IV: ICD-10-PCS | Mod: PBBFAC,,, | Performed by: STUDENT IN AN ORGANIZED HEALTH CARE EDUCATION/TRAINING PROGRAM

## 2021-02-24 PROCEDURE — 99214 OFFICE O/P EST MOD 30 MIN: CPT | Mod: PBBFAC,PO | Performed by: STUDENT IN AN ORGANIZED HEALTH CARE EDUCATION/TRAINING PROGRAM

## 2021-02-24 PROCEDURE — 99214 PR OFFICE/OUTPT VISIT, EST, LEVL IV, 30-39 MIN: ICD-10-PCS | Mod: S$PBB,,, | Performed by: STUDENT IN AN ORGANIZED HEALTH CARE EDUCATION/TRAINING PROGRAM

## 2021-02-24 PROCEDURE — 99214 OFFICE O/P EST MOD 30 MIN: CPT | Mod: S$PBB,,, | Performed by: STUDENT IN AN ORGANIZED HEALTH CARE EDUCATION/TRAINING PROGRAM

## 2021-02-24 RX ORDER — PREGABALIN 50 MG/1
CAPSULE ORAL
COMMUNITY
End: 2021-05-10

## 2021-02-24 ASSESSMENT — ROUTINE ASSESSMENT OF PATIENT INDEX DATA (RAPID3)
PATIENT GLOBAL ASSESSMENT SCORE: 0
MDHAQ FUNCTION SCORE: 0
PAIN SCORE: 0
AM STIFFNESS SCORE: 0, NO
PSYCHOLOGICAL DISTRESS SCORE: 0
TOTAL RAPID3 SCORE: 0
FATIGUE SCORE: 2

## 2021-06-03 ENCOUNTER — HOSPITAL ENCOUNTER (OUTPATIENT)
Dept: RADIOLOGY | Facility: HOSPITAL | Age: 66
Discharge: HOME | End: 2021-06-03
Attending: INTERNAL MEDICINE
Payer: MEDICARE

## 2021-06-03 ENCOUNTER — TELEPHONE (OUTPATIENT)
Dept: FAMILY MEDICINE | Facility: CLINIC | Age: 66
End: 2021-06-03

## 2021-06-03 ENCOUNTER — OFFICE VISIT (OUTPATIENT)
Dept: FAMILY MEDICINE | Facility: CLINIC | Age: 66
End: 2021-06-03
Payer: MEDICARE

## 2021-06-03 VITALS
OXYGEN SATURATION: 97 % | WEIGHT: 142.19 LBS | BODY MASS INDEX: 25.2 KG/M2 | SYSTOLIC BLOOD PRESSURE: 118 MMHG | HEIGHT: 63 IN | HEART RATE: 78 BPM | DIASTOLIC BLOOD PRESSURE: 60 MMHG

## 2021-06-03 DIAGNOSIS — M54.2 NECK PAIN: ICD-10-CM

## 2021-06-03 DIAGNOSIS — M54.2 NECK PAIN: Primary | ICD-10-CM

## 2021-06-03 PROCEDURE — 99999 PR PBB SHADOW E&M-EST. PATIENT-LVL IV: CPT | Mod: PBBFAC,,, | Performed by: INTERNAL MEDICINE

## 2021-06-03 PROCEDURE — 99999 PR PBB SHADOW E&M-EST. PATIENT-LVL IV: ICD-10-PCS | Mod: PBBFAC,,, | Performed by: INTERNAL MEDICINE

## 2021-06-03 PROCEDURE — 72040 XR CERVICAL SPINE AP LATERAL: ICD-10-PCS | Mod: 26,,, | Performed by: RADIOLOGY

## 2021-06-03 PROCEDURE — 72040 X-RAY EXAM NECK SPINE 2-3 VW: CPT | Mod: 26,,, | Performed by: RADIOLOGY

## 2021-06-03 PROCEDURE — 99213 OFFICE O/P EST LOW 20 MIN: CPT | Mod: S$PBB,,, | Performed by: INTERNAL MEDICINE

## 2021-06-03 PROCEDURE — 72040 X-RAY EXAM NECK SPINE 2-3 VW: CPT | Mod: TC,FY,PO

## 2021-06-03 PROCEDURE — 99213 PR OFFICE/OUTPT VISIT, EST, LEVL III, 20-29 MIN: ICD-10-PCS | Mod: S$PBB,,, | Performed by: INTERNAL MEDICINE

## 2021-06-03 PROCEDURE — 99214 OFFICE O/P EST MOD 30 MIN: CPT | Mod: PBBFAC,PO,25 | Performed by: INTERNAL MEDICINE

## 2021-06-04 ENCOUNTER — PATIENT MESSAGE (OUTPATIENT)
Dept: FAMILY MEDICINE | Facility: CLINIC | Age: 66
End: 2021-06-04

## 2021-06-09 ENCOUNTER — PATIENT MESSAGE (OUTPATIENT)
Dept: FAMILY MEDICINE | Facility: CLINIC | Age: 66
End: 2021-06-09

## 2021-06-10 RX ORDER — ESCITALOPRAM OXALATE 10 MG/1
10 TABLET ORAL NIGHTLY
Qty: 30 TABLET | Refills: 11 | Status: SHIPPED | OUTPATIENT
Start: 2021-06-10 | End: 2022-08-06

## 2021-08-11 ENCOUNTER — PATIENT MESSAGE (OUTPATIENT)
Dept: FAMILY MEDICINE | Facility: CLINIC | Age: 66
End: 2021-08-11

## 2021-08-11 DIAGNOSIS — J45.20 ASTHMA, CHRONIC, MILD INTERMITTENT, UNCOMPLICATED: ICD-10-CM

## 2021-08-12 RX ORDER — ALBUTEROL SULFATE 1.25 MG/3ML
2.5 SOLUTION RESPIRATORY (INHALATION) EVERY 6 HOURS PRN
Qty: 90 ML | Refills: 11 | Status: SHIPPED | OUTPATIENT
Start: 2021-08-12 | End: 2022-05-17 | Stop reason: SDUPTHER

## 2021-10-05 ENCOUNTER — HOSPITAL ENCOUNTER (OUTPATIENT)
Dept: RADIOLOGY | Facility: HOSPITAL | Age: 66
Discharge: HOME | End: 2021-10-05
Attending: INTERNAL MEDICINE
Payer: MEDICARE

## 2021-10-05 ENCOUNTER — OFFICE VISIT (OUTPATIENT)
Dept: FAMILY MEDICINE | Facility: CLINIC | Age: 66
End: 2021-10-05
Payer: MEDICARE

## 2021-10-05 VITALS
HEART RATE: 67 BPM | DIASTOLIC BLOOD PRESSURE: 64 MMHG | SYSTOLIC BLOOD PRESSURE: 115 MMHG | OXYGEN SATURATION: 99 % | WEIGHT: 143.5 LBS | BODY MASS INDEX: 25.42 KG/M2

## 2021-10-05 DIAGNOSIS — I25.10 CORONARY ARTERY DISEASE DUE TO CALCIFIED CORONARY LESION: ICD-10-CM

## 2021-10-05 DIAGNOSIS — E55.9 VITAMIN D DEFICIENCY: ICD-10-CM

## 2021-10-05 DIAGNOSIS — I25.84 CORONARY ARTERY DISEASE DUE TO CALCIFIED CORONARY LESION: ICD-10-CM

## 2021-10-05 DIAGNOSIS — Z01.84 IMMUNITY STATUS TESTING: ICD-10-CM

## 2021-10-05 DIAGNOSIS — E78.00 PURE HYPERCHOLESTEROLEMIA: Primary | ICD-10-CM

## 2021-10-05 DIAGNOSIS — M79.673 PAIN OF FOOT, UNSPECIFIED LATERALITY: ICD-10-CM

## 2021-10-05 PROCEDURE — 71046 XR CHEST PA AND LATERAL: ICD-10-PCS | Mod: 26,,, | Performed by: RADIOLOGY

## 2021-10-05 PROCEDURE — 99999 PR PBB SHADOW E&M-EST. PATIENT-LVL IV: CPT | Mod: PBBFAC,,, | Performed by: INTERNAL MEDICINE

## 2021-10-05 PROCEDURE — 71046 X-RAY EXAM CHEST 2 VIEWS: CPT | Mod: 26,,, | Performed by: RADIOLOGY

## 2021-10-05 PROCEDURE — 99214 OFFICE O/P EST MOD 30 MIN: CPT | Mod: PBBFAC,PO,25 | Performed by: INTERNAL MEDICINE

## 2021-10-05 PROCEDURE — 99214 PR OFFICE/OUTPT VISIT, EST, LEVL IV, 30-39 MIN: ICD-10-PCS | Mod: S$PBB,,, | Performed by: INTERNAL MEDICINE

## 2021-10-05 PROCEDURE — 99214 OFFICE O/P EST MOD 30 MIN: CPT | Mod: S$PBB,,, | Performed by: INTERNAL MEDICINE

## 2021-10-05 PROCEDURE — 99999 PR PBB SHADOW E&M-EST. PATIENT-LVL IV: ICD-10-PCS | Mod: PBBFAC,,, | Performed by: INTERNAL MEDICINE

## 2021-10-05 PROCEDURE — 71046 X-RAY EXAM CHEST 2 VIEWS: CPT | Mod: TC,FY,PO

## 2021-10-21 ENCOUNTER — HOSPITAL ENCOUNTER (OUTPATIENT)
Dept: RADIOLOGY | Facility: HOSPITAL | Age: 66
Discharge: HOME | End: 2021-10-21
Attending: STUDENT IN AN ORGANIZED HEALTH CARE EDUCATION/TRAINING PROGRAM
Payer: MEDICARE

## 2021-10-21 ENCOUNTER — OFFICE VISIT (OUTPATIENT)
Dept: PODIATRY | Facility: CLINIC | Age: 66
End: 2021-10-21
Payer: MEDICARE

## 2021-10-21 DIAGNOSIS — M79.673 PAIN OF FOOT, UNSPECIFIED LATERALITY: ICD-10-CM

## 2021-10-21 PROCEDURE — 73630 X-RAY EXAM OF FOOT: CPT | Mod: 26,50,, | Performed by: RADIOLOGY

## 2021-10-21 PROCEDURE — 99213 OFFICE O/P EST LOW 20 MIN: CPT | Mod: PBBFAC,PN | Performed by: STUDENT IN AN ORGANIZED HEALTH CARE EDUCATION/TRAINING PROGRAM

## 2021-10-21 PROCEDURE — 73630 XR FOOT COMPLETE 3 VIEW BILATERAL: ICD-10-PCS | Mod: 26,50,, | Performed by: RADIOLOGY

## 2021-10-21 PROCEDURE — 99999 PR PBB SHADOW E&M-EST. PATIENT-LVL III: CPT | Mod: PBBFAC,,, | Performed by: STUDENT IN AN ORGANIZED HEALTH CARE EDUCATION/TRAINING PROGRAM

## 2021-10-21 PROCEDURE — 99203 OFFICE O/P NEW LOW 30 MIN: CPT | Mod: S$PBB,,, | Performed by: STUDENT IN AN ORGANIZED HEALTH CARE EDUCATION/TRAINING PROGRAM

## 2021-10-21 PROCEDURE — 99999 PR PBB SHADOW E&M-EST. PATIENT-LVL III: ICD-10-PCS | Mod: PBBFAC,,, | Performed by: STUDENT IN AN ORGANIZED HEALTH CARE EDUCATION/TRAINING PROGRAM

## 2021-10-21 PROCEDURE — 99203 PR OFFICE/OUTPT VISIT, NEW, LEVL III, 30-44 MIN: ICD-10-PCS | Mod: S$PBB,,, | Performed by: STUDENT IN AN ORGANIZED HEALTH CARE EDUCATION/TRAINING PROGRAM

## 2021-10-21 PROCEDURE — 73630 X-RAY EXAM OF FOOT: CPT | Mod: TC,50,PO

## 2021-11-22 ENCOUNTER — TELEPHONE (OUTPATIENT)
Dept: RADIOLOGY | Facility: HOSPITAL | Age: 66
End: 2021-11-22
Payer: MEDICARE

## 2021-12-07 ENCOUNTER — HOSPITAL ENCOUNTER (OUTPATIENT)
Dept: RADIOLOGY | Facility: HOSPITAL | Age: 66
Discharge: HOME | End: 2021-12-07
Attending: INTERNAL MEDICINE
Payer: MEDICARE

## 2021-12-07 ENCOUNTER — PATIENT OUTREACH (OUTPATIENT)
Dept: ADMINISTRATIVE | Facility: OTHER | Age: 66
End: 2021-12-07
Payer: MEDICARE

## 2021-12-07 DIAGNOSIS — N60.11 FIBROCYSTIC BREAST CHANGES OF BOTH BREASTS: ICD-10-CM

## 2021-12-07 DIAGNOSIS — N60.12 FIBROCYSTIC BREAST CHANGES OF BOTH BREASTS: ICD-10-CM

## 2021-12-07 DIAGNOSIS — N63.10 LUMP OF RIGHT BREAST: ICD-10-CM

## 2021-12-07 PROCEDURE — 77062 BREAST TOMOSYNTHESIS BI: CPT | Mod: 26,,, | Performed by: RADIOLOGY

## 2021-12-07 PROCEDURE — 77066 MAMMO DIGITAL DIAGNOSTIC BILAT WITH TOMO: ICD-10-PCS | Mod: 26,,, | Performed by: RADIOLOGY

## 2021-12-07 PROCEDURE — 77066 DX MAMMO INCL CAD BI: CPT | Mod: TC,PO

## 2021-12-07 PROCEDURE — 77066 DX MAMMO INCL CAD BI: CPT | Mod: 26,,, | Performed by: RADIOLOGY

## 2021-12-07 PROCEDURE — 77062 MAMMO DIGITAL DIAGNOSTIC BILAT WITH TOMO: ICD-10-PCS | Mod: 26,,, | Performed by: RADIOLOGY

## 2021-12-08 ENCOUNTER — OFFICE VISIT (OUTPATIENT)
Dept: PODIATRY | Facility: CLINIC | Age: 66
End: 2021-12-08
Payer: MEDICARE

## 2021-12-08 VITALS — WEIGHT: 143.5 LBS | HEIGHT: 63 IN | BODY MASS INDEX: 25.43 KG/M2

## 2021-12-08 DIAGNOSIS — M79.673 PAIN OF FOOT, UNSPECIFIED LATERALITY: Primary | ICD-10-CM

## 2021-12-08 PROCEDURE — 99999 PR PBB SHADOW E&M-EST. PATIENT-LVL III: ICD-10-PCS | Mod: PBBFAC,,, | Performed by: STUDENT IN AN ORGANIZED HEALTH CARE EDUCATION/TRAINING PROGRAM

## 2021-12-08 PROCEDURE — 99213 OFFICE O/P EST LOW 20 MIN: CPT | Mod: S$PBB,,, | Performed by: STUDENT IN AN ORGANIZED HEALTH CARE EDUCATION/TRAINING PROGRAM

## 2021-12-08 PROCEDURE — 99999 PR PBB SHADOW E&M-EST. PATIENT-LVL III: CPT | Mod: PBBFAC,,, | Performed by: STUDENT IN AN ORGANIZED HEALTH CARE EDUCATION/TRAINING PROGRAM

## 2021-12-08 PROCEDURE — 99213 PR OFFICE/OUTPT VISIT, EST, LEVL III, 20-29 MIN: ICD-10-PCS | Mod: S$PBB,,, | Performed by: STUDENT IN AN ORGANIZED HEALTH CARE EDUCATION/TRAINING PROGRAM

## 2021-12-08 PROCEDURE — 99213 OFFICE O/P EST LOW 20 MIN: CPT | Mod: PBBFAC,PN | Performed by: STUDENT IN AN ORGANIZED HEALTH CARE EDUCATION/TRAINING PROGRAM

## 2022-01-25 PROBLEM — K82.8 BILIARY DYSKINESIA: Status: ACTIVE | Noted: 2022-01-25

## 2022-03-31 ENCOUNTER — OFFICE VISIT (OUTPATIENT)
Dept: FAMILY MEDICINE | Facility: CLINIC | Age: 67
End: 2022-03-31
Payer: MEDICARE

## 2022-03-31 ENCOUNTER — LAB VISIT (OUTPATIENT)
Dept: LAB | Facility: HOSPITAL | Age: 67
End: 2022-03-31
Attending: NURSE PRACTITIONER
Payer: MEDICARE

## 2022-03-31 VITALS
WEIGHT: 144.81 LBS | DIASTOLIC BLOOD PRESSURE: 64 MMHG | HEIGHT: 63 IN | SYSTOLIC BLOOD PRESSURE: 108 MMHG | HEART RATE: 78 BPM | BODY MASS INDEX: 25.66 KG/M2 | OXYGEN SATURATION: 97 %

## 2022-03-31 DIAGNOSIS — E78.00 PURE HYPERCHOLESTEROLEMIA: ICD-10-CM

## 2022-03-31 DIAGNOSIS — Z00.00 ENCOUNTER FOR PREVENTIVE HEALTH EXAMINATION: Primary | ICD-10-CM

## 2022-03-31 DIAGNOSIS — R53.83 FATIGUE, UNSPECIFIED TYPE: ICD-10-CM

## 2022-03-31 DIAGNOSIS — Z00.00 ENCOUNTER FOR PREVENTIVE HEALTH EXAMINATION: ICD-10-CM

## 2022-03-31 LAB
ALBUMIN SERPL BCP-MCNC: 4 G/DL (ref 3.5–5.2)
ALP SERPL-CCNC: 94 U/L (ref 55–135)
ALT SERPL W/O P-5'-P-CCNC: 36 U/L (ref 10–44)
ANION GAP SERPL CALC-SCNC: 12 MMOL/L (ref 8–16)
AST SERPL-CCNC: 32 U/L (ref 10–40)
BASOPHILS # BLD AUTO: 0.01 K/UL (ref 0–0.2)
BASOPHILS NFR BLD: 0.2 % (ref 0–1.9)
BILIRUB SERPL-MCNC: 1.2 MG/DL (ref 0.1–1)
BUN SERPL-MCNC: 15 MG/DL (ref 8–23)
CALCIUM SERPL-MCNC: 9.2 MG/DL (ref 8.7–10.5)
CHLORIDE SERPL-SCNC: 106 MMOL/L (ref 95–110)
CO2 SERPL-SCNC: 25 MMOL/L (ref 23–29)
CREAT SERPL-MCNC: 0.8 MG/DL (ref 0.5–1.4)
DIFFERENTIAL METHOD: ABNORMAL
EOSINOPHIL # BLD AUTO: 0.1 K/UL (ref 0–0.5)
EOSINOPHIL NFR BLD: 2.6 % (ref 0–8)
ERYTHROCYTE [DISTWIDTH] IN BLOOD BY AUTOMATED COUNT: 12.7 % (ref 11.5–14.5)
EST. GFR  (AFRICAN AMERICAN): >60 ML/MIN/1.73 M^2
EST. GFR  (NON AFRICAN AMERICAN): >60 ML/MIN/1.73 M^2
GLUCOSE SERPL-MCNC: 117 MG/DL (ref 70–110)
HCT VFR BLD AUTO: 40.5 % (ref 37–48.5)
HGB BLD-MCNC: 14 G/DL (ref 12–16)
IMM GRANULOCYTES # BLD AUTO: 0.01 K/UL (ref 0–0.04)
IMM GRANULOCYTES NFR BLD AUTO: 0.2 % (ref 0–0.5)
LYMPHOCYTES # BLD AUTO: 1.1 K/UL (ref 1–4.8)
LYMPHOCYTES NFR BLD: 23 % (ref 18–48)
MCH RBC QN AUTO: 31.5 PG (ref 27–31)
MCHC RBC AUTO-ENTMCNC: 34.6 G/DL (ref 32–36)
MCV RBC AUTO: 91 FL (ref 82–98)
MONOCYTES # BLD AUTO: 0.4 K/UL (ref 0.3–1)
MONOCYTES NFR BLD: 8.5 % (ref 4–15)
NEUTROPHILS # BLD AUTO: 3.1 K/UL (ref 1.8–7.7)
NEUTROPHILS NFR BLD: 65.5 % (ref 38–73)
NRBC BLD-RTO: 0 /100 WBC
PLATELET # BLD AUTO: 174 K/UL (ref 150–450)
PMV BLD AUTO: 10.4 FL (ref 9.2–12.9)
POTASSIUM SERPL-SCNC: 3.9 MMOL/L (ref 3.5–5.1)
PROT SERPL-MCNC: 6.4 G/DL (ref 6–8.4)
RBC # BLD AUTO: 4.45 M/UL (ref 4–5.4)
SODIUM SERPL-SCNC: 143 MMOL/L (ref 136–145)
WBC # BLD AUTO: 4.7 K/UL (ref 3.9–12.7)

## 2022-03-31 PROCEDURE — 85025 COMPLETE CBC W/AUTO DIFF WBC: CPT | Mod: PO | Performed by: NURSE PRACTITIONER

## 2022-03-31 PROCEDURE — 36415 COLL VENOUS BLD VENIPUNCTURE: CPT | Mod: PO | Performed by: NURSE PRACTITIONER

## 2022-03-31 PROCEDURE — G0439 PPPS, SUBSEQ VISIT: HCPCS | Mod: ,,, | Performed by: NURSE PRACTITIONER

## 2022-03-31 PROCEDURE — 80061 LIPID PANEL: CPT | Performed by: NURSE PRACTITIONER

## 2022-03-31 PROCEDURE — 99214 OFFICE O/P EST MOD 30 MIN: CPT | Mod: PBBFAC,PO | Performed by: NURSE PRACTITIONER

## 2022-03-31 PROCEDURE — 80053 COMPREHEN METABOLIC PANEL: CPT | Mod: PO | Performed by: NURSE PRACTITIONER

## 2022-03-31 PROCEDURE — G0439 PR MEDICARE ANNUAL WELLNESS SUBSEQUENT VISIT: ICD-10-PCS | Mod: ,,, | Performed by: NURSE PRACTITIONER

## 2022-03-31 PROCEDURE — 99999 PR PBB SHADOW E&M-EST. PATIENT-LVL IV: CPT | Mod: PBBFAC,,, | Performed by: NURSE PRACTITIONER

## 2022-03-31 PROCEDURE — 84443 ASSAY THYROID STIM HORMONE: CPT | Performed by: NURSE PRACTITIONER

## 2022-03-31 PROCEDURE — 99999 PR PBB SHADOW E&M-EST. PATIENT-LVL IV: ICD-10-PCS | Mod: PBBFAC,,, | Performed by: NURSE PRACTITIONER

## 2022-03-31 RX ORDER — AZELASTINE 1 MG/ML
1 SPRAY, METERED NASAL 2 TIMES DAILY PRN
Qty: 30 ML | Refills: 3 | Status: SHIPPED | OUTPATIENT
Start: 2022-03-31 | End: 2022-06-30

## 2022-03-31 NOTE — PROGRESS NOTES
"  Mery Krueger presented for a  Medicare AWV and comprehensive Health Risk Assessment today. The following components were reviewed and updated:    · Medical history  · Family History  · Social history  · Allergies and Current Medications  · Health Risk Assessment  · Health Maintenance  · Care Team     ** See Completed Assessments for Annual Wellness Visit within the encounter summary.**     The following assessments were completed:  · Living Situation  · CAGE  · Depression Screening  · Timed Get Up and Go  · Whisper Test  · Cognitive Function Screening    · Nutrition Screening  · ADL Screening  · PAQ Screening    Vitals:    03/31/22 0800   BP: 108/64   BP Location: Left arm   Patient Position: Sitting   BP Method: Medium (Manual)   Pulse: 78   SpO2: 97%   Weight: 65.7 kg (144 lb 13.5 oz)   Height: 5' 3" (1.6 m)     Body mass index is 25.66 kg/m².  Physical Exam  Vitals reviewed.   Constitutional:       Appearance: Normal appearance.   Cardiovascular:      Rate and Rhythm: Normal rate and regular rhythm.      Pulses: Normal pulses.      Heart sounds: Normal heart sounds.   Pulmonary:      Effort: Pulmonary effort is normal. No respiratory distress.      Breath sounds: Normal breath sounds.   Skin:     General: Skin is warm and dry.   Neurological:      Mental Status: She is alert and oriented to person, place, and time.   Psychiatric:         Mood and Affect: Mood normal.         Behavior: Behavior normal.     Diagnoses and health risks identified today and associated recommendations/orders:    1. Encounter for preventive health examination  Reviewed and discussed health maintenance.    - Lipid Panel; Future  - Comprehensive Metabolic Panel; Future  - CBC Auto Differential; Future  - TSH; Future    2. Fatigue, unspecified type  Stable- continue current treatment and follow up routinely with PCP   - CBC Auto Differential; Future  - TSH; Future    3. Pure hypercholesterolemia  Stable- continue current treatment " and follow up routinely with PCP   - Lipid Panel; Future  - Comprehensive Metabolic Panel; Future    Provided Mery with a 5-10 year written screening schedule and personal prevention plan. Recommendations were developed using the USPSTF age appropriate recommendations. Education, counseling, and referrals were provided as needed. After Visit Summary printed and given to patient which includes a list of additional screenings\tests needed.    I offered to discuss advanced care planning, including how to pick a person who would make decisions for you if you were unable to make them for yourself, called a health care power of , and what kind of decisions you might make such as use of life sustaining treatments such as ventilators and tube feeding when faced with a life limiting illness recorded on a living will that they will need to know. (How you want to be cared for as you near the end of your natural life)     X Patient is interested in learning more about how to make advanced directives.  I provided them paperwork and offered to discuss this with them.    Yesi Salgado, NP

## 2022-03-31 NOTE — PATIENT INSTRUCTIONS
Counseling and Referral of Other Preventative  (Italic type indicates deductible and co-insurance are waived)    Patient Name: Mery Krueger  Today's Date: 3/31/2022    Health Maintenance       Date Due Completion Date    TETANUS VACCINE 09/15/2015 9/15/2005    Shingles Vaccine (2 of 3) 01/04/2016 11/9/2015    COVID-19 Vaccine (3 - Booster for Moderna series) 07/05/2021 2/5/2021    Influenza Vaccine (1) 09/01/2021 11/6/2014    Pneumococcal Vaccines (Age 65+) (2 of 2 - PPSV23) 10/07/2021 10/7/2020    Lipid Panel 09/07/2022 9/7/2021    Mammogram 12/07/2022 12/7/2021    Override on 3/1/2013: Done    Colorectal Cancer Screening 01/21/2023 1/21/2013 (Done)    Override on 1/21/2013: Done ([facility]-in media)    High Dose Statin 03/31/2023 3/31/2022    Aspirin/Antiplatelet Therapy 03/31/2023 3/31/2022    DEXA Scan 11/09/2023 11/9/2020        No orders of the defined types were placed in this encounter.    The following information is provided to all patients.  This information is to help you find resources for any of the problems found today that may be affecting your health:                Living healthy guide: www.[facility].[place].gov      Understanding Diabetes: www.diabetes.org      Eating healthy: www.cdc.gov/healthyweight      CDC home safety checklist: www.cdc.gov/steadi/patient.html      Agency on Aging: www.goea.[place].[facility]      Alcoholics anonymous (AA): www.aa.org      Physical Activity: www.carolann.nih.gov/ao7xucg      Tobacco use: www.quitwithusla.org

## 2022-04-01 LAB
CHOLEST SERPL-MCNC: 123 MG/DL (ref 120–199)
CHOLEST/HDLC SERPL: 2.1 {RATIO} (ref 2–5)
HDLC SERPL-MCNC: 59 MG/DL (ref 40–75)
HDLC SERPL: 48 % (ref 20–50)
LDLC SERPL CALC-MCNC: 53.4 MG/DL (ref 63–159)
NONHDLC SERPL-MCNC: 64 MG/DL
TRIGL SERPL-MCNC: 53 MG/DL (ref 30–150)
TSH SERPL DL<=0.005 MIU/L-ACNC: 2.45 UIU/ML (ref 0.4–4)

## 2022-04-04 ENCOUNTER — TELEPHONE (OUTPATIENT)
Dept: FAMILY MEDICINE | Facility: CLINIC | Age: 67
End: 2022-04-04
Payer: MEDICARE

## 2022-04-04 DIAGNOSIS — R73.01 ELEVATED FASTING GLUCOSE: Primary | ICD-10-CM

## 2022-04-04 NOTE — TELEPHONE ENCOUNTER
Please call patient with the following:  Glucose is slightly elevated but otherwise, bloodwork is unremarkable. I would like to do additional labs to evaluate/rule out diabetes. pls viraj A1c.   Thank you

## 2022-05-09 ENCOUNTER — PATIENT MESSAGE (OUTPATIENT)
Dept: SMOKING CESSATION | Facility: CLINIC | Age: 67
End: 2022-05-09
Payer: MEDICARE

## 2022-05-16 ENCOUNTER — PATIENT MESSAGE (OUTPATIENT)
Dept: FAMILY MEDICINE | Facility: CLINIC | Age: 67
End: 2022-05-16
Payer: MEDICARE

## 2022-05-16 DIAGNOSIS — J45.20 ASTHMA, CHRONIC, MILD INTERMITTENT, UNCOMPLICATED: ICD-10-CM

## 2022-05-17 ENCOUNTER — OFFICE VISIT (OUTPATIENT)
Dept: FAMILY MEDICINE | Facility: CLINIC | Age: 67
End: 2022-05-17
Payer: MEDICARE

## 2022-05-17 VITALS
DIASTOLIC BLOOD PRESSURE: 68 MMHG | HEIGHT: 63 IN | HEART RATE: 86 BPM | BODY MASS INDEX: 25.32 KG/M2 | OXYGEN SATURATION: 97 % | TEMPERATURE: 99 F | SYSTOLIC BLOOD PRESSURE: 98 MMHG | WEIGHT: 142.88 LBS

## 2022-05-17 DIAGNOSIS — R05.9 COUGH: Primary | ICD-10-CM

## 2022-05-17 LAB
CTP QC/QA: YES
CTP QC/QA: YES
INFLUENZA A, MOLECULAR: NEGATIVE
INFLUENZA B, MOLECULAR: NEGATIVE
S PYO RRNA THROAT QL PROBE: NEGATIVE
SARS-COV-2 RDRP RESP QL NAA+PROBE: NEGATIVE
SPECIMEN SOURCE: NORMAL

## 2022-05-17 PROCEDURE — 99999 PR PBB SHADOW E&M-EST. PATIENT-LVL III: ICD-10-PCS | Mod: PBBFAC,,, | Performed by: NURSE PRACTITIONER

## 2022-05-17 PROCEDURE — U0002 COVID-19 LAB TEST NON-CDC: HCPCS | Mod: PBBFAC,PO | Performed by: NURSE PRACTITIONER

## 2022-05-17 PROCEDURE — 87880 STREP A ASSAY W/OPTIC: CPT | Mod: PBBFAC,PO | Performed by: NURSE PRACTITIONER

## 2022-05-17 PROCEDURE — 99214 OFFICE O/P EST MOD 30 MIN: CPT | Mod: S$PBB,,, | Performed by: NURSE PRACTITIONER

## 2022-05-17 PROCEDURE — 99999 PR PBB SHADOW E&M-EST. PATIENT-LVL III: CPT | Mod: PBBFAC,,, | Performed by: NURSE PRACTITIONER

## 2022-05-17 PROCEDURE — 99214 PR OFFICE/OUTPT VISIT, EST, LEVL IV, 30-39 MIN: ICD-10-PCS | Mod: S$PBB,,, | Performed by: NURSE PRACTITIONER

## 2022-05-17 PROCEDURE — 96372 THER/PROPH/DIAG INJ SC/IM: CPT | Mod: PBBFAC,PO

## 2022-05-17 PROCEDURE — 99213 OFFICE O/P EST LOW 20 MIN: CPT | Mod: 25,PBBFAC,PO | Performed by: NURSE PRACTITIONER

## 2022-05-17 PROCEDURE — 87502 INFLUENZA DNA AMP PROBE: CPT | Mod: PO | Performed by: NURSE PRACTITIONER

## 2022-05-17 RX ORDER — FLUTICASONE PROPIONATE 50 MCG
1 SPRAY, SUSPENSION (ML) NASAL DAILY
Qty: 16 G | Refills: 0 | Status: SHIPPED | OUTPATIENT
Start: 2022-05-17 | End: 2022-07-11

## 2022-05-17 RX ORDER — GUAIFENESIN 1200 MG/1
1200 TABLET, EXTENDED RELEASE ORAL 2 TIMES DAILY
Qty: 20 TABLET | Refills: 0 | Status: SHIPPED | OUTPATIENT
Start: 2022-05-17 | End: 2022-05-27

## 2022-05-17 RX ORDER — ALBUTEROL SULFATE 90 UG/1
2 AEROSOL, METERED RESPIRATORY (INHALATION) EVERY 6 HOURS PRN
Qty: 8 G | Refills: 0 | Status: SHIPPED | OUTPATIENT
Start: 2022-05-17

## 2022-05-17 RX ORDER — ALBUTEROL SULFATE 1.25 MG/3ML
2.5 SOLUTION RESPIRATORY (INHALATION) EVERY 6 HOURS PRN
Qty: 90 ML | Refills: 11 | Status: SHIPPED | OUTPATIENT
Start: 2022-05-17 | End: 2022-11-15

## 2022-05-17 RX ORDER — AZELASTINE 1 MG/ML
1 SPRAY, METERED NASAL 2 TIMES DAILY
Qty: 30 ML | Refills: 0 | Status: SHIPPED | OUTPATIENT
Start: 2022-05-17 | End: 2022-11-15

## 2022-05-17 RX ORDER — DEXAMETHASONE SODIUM PHOSPHATE 4 MG/ML
8 INJECTION, SOLUTION INTRA-ARTICULAR; INTRALESIONAL; INTRAMUSCULAR; INTRAVENOUS; SOFT TISSUE
Status: COMPLETED | OUTPATIENT
Start: 2022-05-17 | End: 2022-05-17

## 2022-05-17 RX ORDER — PROMETHAZINE HYDROCHLORIDE AND DEXTROMETHORPHAN HYDROBROMIDE 6.25; 15 MG/5ML; MG/5ML
5 SYRUP ORAL 3 TIMES DAILY PRN
Qty: 240 ML | Refills: 0 | Status: SHIPPED | OUTPATIENT
Start: 2022-05-17 | End: 2022-05-27

## 2022-05-17 RX ADMIN — DEXAMETHASONE SODIUM PHOSPHATE 8 MG: 4 INJECTION INTRA-ARTICULAR; INTRALESIONAL; INTRAMUSCULAR; INTRAVENOUS; SOFT TISSUE at 10:05

## 2022-05-17 NOTE — TELEPHONE ENCOUNTER
No new care gaps identified.  [facility] Embedded Care Gaps. Reference number: [id number]. 5/17/2022   9:14:40 AM FIDENCIOT

## 2022-05-17 NOTE — PROGRESS NOTES
Subjective:       Patient ID: Mery Krueger is a 67 y.o. female.    Chief Complaint: Cough (Starting last nigt cough , low grade fever and everytime she coughed it was a burning sensation. Took a covid test this morning negative)    Patient started with symptoms last night. Fever , low grade, sore throat, cough. Noticed some pain in her mid back last night. Has asthma, feels mildly short of breath today.    Past Medical History:  No date: Arthritis  No date: Asthma  No date: Coronary artery disease      Comment:  Dr. Pereira  No date: GERD (gastroesophageal reflux disease)  No date: Hyperlipidemia  No date: Thyroid disease    Past Surgical History:  No date: ABDOMINAL SURGERY  No date: ANKLE FRACTURE SURGERY  No date: CARDIAC CATHETERIZATION  No date:  SECTION  1/3/2020: CORONARY ANGIOGRAPHY; Left      Comment:  Procedure: ANGIOGRAM, CORONARY ARTERY;  Surgeon: Aleks Vera MD;  Location: [facility] CATH;  Service: Cardiology;                 Laterality: Left;  No date: FRACTURE SURGERY  2022: LAPAROSCOPIC CHOLECYSTECTOMY; N/A      Comment:  Procedure: CHOLECYSTECTOMY-LAPAROSCOPIC;  Surgeon: Alex Rincon MD;  Location: [facility] OR;  Service: General;                 Laterality: N/A;  2018: LEFT HEART CATHETERIZATION; Left      Comment:  Procedure: CATHETERIZATION, HEART, LEFT;  Surgeon: Mark Franco MD;  Location: [facility] CATH;  Service: Cardiology;                Laterality: Left;  1/3/2020: LEFT HEART CATHETERIZATION; Left      Comment:  Procedure: Left heart cath;  Surgeon: Aleks Vera MD;                 Location: [facility] CATH;  Service: Cardiology;  Laterality:                Left;  2018: PERCUTANEOUS TRANSLUMINAL BALLOON ANGIOPLASTY OF CORONARY   ARTERY      Comment:  Procedure: Angioplasty-coronary;  Surgeon: Mark Franco MD;  Location: [facility] CATH;  Service: Cardiology;;  No date: SHOULDER ARTHROSCOPY    Review of patient's family  history indicates:  Problem: Heart disease      Relation: Mother          Age of Onset: (Not Specified)  Problem: COPD      Relation: Father          Age of Onset: (Not Specified)      Social History    Socioeconomic History      Marital status:     Tobacco Use      Smoking status: Never Smoker      Smokeless tobacco: Never Used    Substance and Sexual Activity      Alcohol use: Yes        Alcohol/week: 5.0 standard drinks        Types: 5 Glasses of wine per week      Drug use: No      Sexual activity: Not Currently    Social Determinants of Health  Financial Resource Strain: Low Risk       Difficulty of Paying Living Expenses: Not hard at all  Food Insecurity: No Food Insecurity      Worried About Running Out of Food in the Last Year: Never true      Ran Out of Food in the Last Year: Never true  Transportation Needs: No Transportation Needs      Lack of Transportation (Medical): No      Lack of Transportation (Non-Medical): No  Physical Activity: Sufficiently Active      Days of Exercise per Week: 5 days      Minutes of Exercise per Session: 60 min  Stress: No Stress Concern Present      Feeling of Stress : Not at all  Social Connections: Unknown      Frequency of Communication with Friends and Family: More than three times a week      Frequency of Social Gatherings with Friends and Family: More than three times a week      Active Member of Clubs or Organizations: No      Attends Club or Organization Meetings: 1 to 4 times per year      Marital Status:   Housing Stability: Low Risk       Unable to Pay for Housing in the Last Year: No      Number of Places Lived in the Last Year: 1      Unstable Housing in the Last Year: No    Current Outpatient Medications:  albuterol (ACCUNEB) 1.25 mg/3 mL Nebu, Take 6 mLs (2.5 mg total) by nebulization every 6 (six) hours as needed., Disp: 90 mL, Rfl: 11  ascorbic acid, vitamin C, (VITAMIN C) 1000 MG tablet, Take 1,000 mg by mouth once daily., Disp: , Rfl:    azelastine (ASTELIN) 137 mcg (0.1 %) nasal spray, 1 spray (137 mcg total) by Nasal route 2 (two) times daily as needed (allergy)., Disp: 30 mL, Rfl: 3  clopidogreL (PLAVIX) 75 mg tablet, TAKE 1 TABLET BY MOUTH EVERY DAY, Disp: 90 tablet, Rfl: 3  EScitalopram oxalate (LEXAPRO) 10 MG tablet, Take 1 tablet (10 mg total) by mouth every evening., Disp: 30 tablet, Rfl: 11  ezetimibe (ZETIA) 10 mg tablet, Take 1 tablet (10 mg total) by mouth once daily., Disp: 90 tablet, Rfl: 3  hydrocortisone 2.5 % cream, APPLY TO AFFECTED AREAS TWICE DAILY UP TO 2 WEEKS/MONTH AS NEEDED., Disp: , Rfl: 1  ketoconazole (NIZORAL) 2 % shampoo, Apply topically twice a week. , Disp: , Rfl:   levothyroxine (SYNTHROID) 25 MCG tablet, TAKE 1 TABLET (25 MCG TOTAL) BY MOUTH BEFORE BREAKFAST., Disp: 90 tablet, Rfl: 3  rosuvastatin (CRESTOR) 20 MG tablet, TAKE 1 TABLET BY MOUTH EVERY DAY IN THE EVENING, Disp: 90 tablet, Rfl: 3  triamcinolone acetonide 0.1% (KENALOG) 0.1 % cream, 2 (two) times daily as needed. Apply to affected area, Disp: , Rfl:     No current facility-administered medications for this visit.      Review of patient's allergies indicates:  No Known Allergies    Review of Systems   Constitutional: Positive for fatigue and fever.   HENT: Positive for postnasal drip and sore throat. Negative for ear pain.    Respiratory: Positive for cough.    Musculoskeletal: Positive for back pain.   Neurological: Negative for headaches.         Objective:      Physical Exam    Assessment:       Problem List Items Addressed This Visit    None     Visit Diagnoses     Cough    -  Primary    Relevant Medications    albuterol (VENTOLIN HFA) 90 mcg/actuation inhaler    dexamethasone injection 8 mg    promethazine-dextromethorphan (PROMETHAZINE-DM) 6.25-15 mg/5 mL Syrp    fluticasone propionate (FLONASE) 50 mcg/actuation nasal spray    azelastine (ASTELIN) 137 mcg (0.1 %) nasal spray    guaiFENesin (MUCINEX) 1,200 mg Ta12    Other Relevant Orders    POCT  COVID-19 Rapid Screening    Influenza A & B by Molecular (Completed)    POCT Rapid Strep A (Completed)          Plan:       1. Cough  COVID, FLu, and strep negative, results reviewed with patient Treat as below, follow up if symptoms are not resolving, immediately for new or worsening symptoms.   - POCT COVID-19 Rapid Screening  - Influenza A & B by Molecular  - POCT Rapid Strep A  - albuterol (VENTOLIN HFA) 90 mcg/actuation inhaler; Inhale 2 puffs into the lungs every 6 (six) hours as needed for Wheezing. Rescue  Dispense: 8 g; Refill: 0  - dexamethasone injection 8 mg  - promethazine-dextromethorphan (PROMETHAZINE-DM) 6.25-15 mg/5 mL Syrp; Take 5 mLs by mouth 3 (three) times daily as needed.  Dispense: 240 mL; Refill: 0  - fluticasone propionate (FLONASE) 50 mcg/actuation nasal spray; 1 spray (50 mcg total) by Each Nostril route once daily.  Dispense: 16 g; Refill: 0  - azelastine (ASTELIN) 137 mcg (0.1 %) nasal spray; 1 spray (137 mcg total) by Nasal route 2 (two) times daily.  Dispense: 30 mL; Refill: 0  - guaiFENesin (MUCINEX) 1,200 mg Ta12; Take 1,200 mg by mouth 2 (two) times a day. for 10 days  Dispense: 20 tablet; Refill: 0

## 2022-05-19 ENCOUNTER — PATIENT MESSAGE (OUTPATIENT)
Dept: FAMILY MEDICINE | Facility: CLINIC | Age: 67
End: 2022-05-19
Payer: MEDICARE

## 2022-05-19 NOTE — TELEPHONE ENCOUNTER
All of the medications I prescribed are medicines we also suggest during COVID 19. Your risk score (2) is not high enough to qualify for the monoclonal antibody infusion. The medication, paxlovid, that is available interacts with several of your current medications so that is not a great option.   I would recommend you take the medication prescribed at the visit, along with Vitamin C, Vitamin D, and Zinc. Quarantine x 5 days from symptoms onset. Seek immediate in person evaluation for significant shortness of breath, chest pain, or other worsening symptoms.

## 2022-05-24 ENCOUNTER — PATIENT MESSAGE (OUTPATIENT)
Dept: FAMILY MEDICINE | Facility: CLINIC | Age: 67
End: 2022-05-24
Payer: MEDICARE

## 2022-05-24 NOTE — TELEPHONE ENCOUNTER
Spoke with pt, instructed her to go to ER/urgent care to be evaluated today. Pt verbalized understanding

## 2022-07-17 ENCOUNTER — PATIENT MESSAGE (OUTPATIENT)
Dept: FAMILY MEDICINE | Facility: CLINIC | Age: 67
End: 2022-07-17
Payer: MEDICARE

## 2022-08-07 ENCOUNTER — PATIENT MESSAGE (OUTPATIENT)
Dept: FAMILY MEDICINE | Facility: CLINIC | Age: 67
End: 2022-08-07
Payer: MEDICARE

## 2022-08-08 RX ORDER — ALPRAZOLAM 0.5 MG/1
0.5 TABLET ORAL 3 TIMES DAILY PRN
Qty: 90 TABLET | Refills: 0 | Status: SHIPPED | OUTPATIENT
Start: 2022-08-08 | End: 2022-09-08

## 2022-09-24 ENCOUNTER — PATIENT MESSAGE (OUTPATIENT)
Dept: FAMILY MEDICINE | Facility: CLINIC | Age: 67
End: 2022-09-24
Payer: MEDICARE

## 2022-09-30 ENCOUNTER — OFFICE VISIT (OUTPATIENT)
Dept: PRIMARY CARE CLINIC | Facility: CLINIC | Age: 67
End: 2022-09-30
Payer: MEDICARE

## 2022-09-30 VITALS
SYSTOLIC BLOOD PRESSURE: 110 MMHG | TEMPERATURE: 98 F | HEIGHT: 63 IN | BODY MASS INDEX: 23.69 KG/M2 | DIASTOLIC BLOOD PRESSURE: 68 MMHG | HEART RATE: 70 BPM | WEIGHT: 133.69 LBS | OXYGEN SATURATION: 98 %

## 2022-09-30 DIAGNOSIS — K21.9 GASTROESOPHAGEAL REFLUX DISEASE, UNSPECIFIED WHETHER ESOPHAGITIS PRESENT: ICD-10-CM

## 2022-09-30 DIAGNOSIS — I25.84 CORONARY ARTERY DISEASE DUE TO CALCIFIED CORONARY LESION: ICD-10-CM

## 2022-09-30 DIAGNOSIS — I25.10 CORONARY ARTERY DISEASE DUE TO CALCIFIED CORONARY LESION: ICD-10-CM

## 2022-09-30 DIAGNOSIS — M54.50 ACUTE LEFT-SIDED LOW BACK PAIN WITHOUT SCIATICA: Primary | ICD-10-CM

## 2022-09-30 DIAGNOSIS — J45.20 MILD INTERMITTENT ASTHMA WITHOUT COMPLICATION: ICD-10-CM

## 2022-09-30 DIAGNOSIS — E78.00 PURE HYPERCHOLESTEROLEMIA: ICD-10-CM

## 2022-09-30 DIAGNOSIS — F43.21 GRIEF: ICD-10-CM

## 2022-09-30 DIAGNOSIS — M72.2 PLANTAR FASCIITIS: ICD-10-CM

## 2022-09-30 DIAGNOSIS — I21.4 NSTEMI (NON-ST ELEVATED MYOCARDIAL INFARCTION): ICD-10-CM

## 2022-09-30 PROCEDURE — 99215 OFFICE O/P EST HI 40 MIN: CPT | Mod: PBBFAC,PN | Performed by: INTERNAL MEDICINE

## 2022-09-30 PROCEDURE — 99215 PR OFFICE/OUTPT VISIT, EST, LEVL V, 40-54 MIN: ICD-10-PCS | Mod: S$PBB,,, | Performed by: INTERNAL MEDICINE

## 2022-09-30 PROCEDURE — 99215 OFFICE O/P EST HI 40 MIN: CPT | Mod: S$PBB,,, | Performed by: INTERNAL MEDICINE

## 2022-09-30 PROCEDURE — 99999 PR PBB SHADOW E&M-EST. PATIENT-LVL V: ICD-10-PCS | Mod: PBBFAC,,, | Performed by: INTERNAL MEDICINE

## 2022-09-30 PROCEDURE — 99999 PR PBB SHADOW E&M-EST. PATIENT-LVL V: CPT | Mod: PBBFAC,,, | Performed by: INTERNAL MEDICINE

## 2022-09-30 NOTE — PROGRESS NOTES
[facility]      CHIEF COMPLAINT     Chief Complaint   Patient presents with    [facility]    Back Pain       HPI     Mery Krueger is a 67 y.o.  female who presents with a PMHx of  asthma, history of MI, GERD, HLD, hypothyroid, arthritis, CAD, who presents today to [facility].  Has had a horrible past few weeks. Her  recently  8 weeks before after suffering an MI in his sleep. She has been spending a great deal of time with her daughter who just had a knee surgery. She has been very busy with that.   From a health standpoint, she has been doig well otherwise, with not much notable medical complaints.   No chest pain. Adequate follow up for the CAD with stents.     CHRONIC HEALTH ISSUES:   Past Medical History:  Past Medical History:   Diagnosis Date    Arthritis     Asthma     Coronary artery disease     Dr. Pereira    GERD (gastroesophageal reflux disease)     Hyperlipidemia     Thyroid disease        Past Surgical History:  Past Surgical History:   Procedure Laterality Date    ABDOMINAL SURGERY      ANKLE FRACTURE SURGERY      CARDIAC CATHETERIZATION       SECTION      CORONARY ANGIOGRAPHY Left 1/3/2020    Procedure: ANGIOGRAM, CORONARY ARTERY;  Surgeon: Aleks Vera MD;  Location: [facility] CATH;  Service: Cardiology;  Laterality: Left;    FRACTURE SURGERY      LAPAROSCOPIC CHOLECYSTECTOMY N/A 2022    Procedure: CHOLECYSTECTOMY-LAPAROSCOPIC;  Surgeon: Alex Rincon MD;  Location: [facility] OR;  Service: General;  Laterality: N/A;    LEFT HEART CATHETERIZATION Left 2018    Procedure: CATHETERIZATION, HEART, LEFT;  Surgeon: Mark Franco MD;  Location: [facility] CATH;  Service: Cardiology;  Laterality: Left;    LEFT HEART CATHETERIZATION Left 1/3/2020    Procedure: Left heart cath;  Surgeon: Aleks Vera MD;  Location: [facility] CATH;  Service: Cardiology;  Laterality: Left;    PERCUTANEOUS TRANSLUMINAL BALLOON ANGIOPLASTY OF CORONARY ARTERY   12/19/2018    Procedure: Angioplasty-coronary;  Surgeon: Mark Franco MD;  Location: [facility];  Service: Cardiology;;    SHOULDER ARTHROSCOPY         Allergies:  Review of patient's allergies indicates:  No Known Allergies    Home Medications:  Prior to Admission medications    Medication Sig Start Date End Date Taking? Authorizing Provider   albuterol (VENTOLIN HFA) 90 mcg/actuation inhaler Inhale 2 puffs into the lungs every 6 (six) hours as needed for Wheezing. Rescue 5/17/22  Yes Sabi Powell NP   ALPRAZolam (XANAX) 0.5 MG tablet TAKE 1 TABLET (0.5 MG TOTAL) BY MOUTH 3 (THREE) TIMES DAILY AS NEEDED FOR ANXIETY. 9/8/22 10/8/22 Yes Teri Magallon MD   ascorbic acid, vitamin C, (VITAMIN C) 1000 MG tablet Take 1,000 mg by mouth once daily.   Yes Historical Provider   azelastine (ASTELIN) 137 mcg (0.1 %) nasal spray 1 SPRAY (137 MCG TOTAL) BY NASAL ROUTE 2 (TWO) TIMES DAILY AS NEEDED (ALLERGY). 8/1/22  Yes Yesi Salgado NP   clopidogreL (PLAVIX) 75 mg tablet TAKE 1 TABLET BY MOUTH EVERY DAY 6/29/22  Yes Charo Wright NP   EScitalopram oxalate (LEXAPRO) 10 MG tablet TAKE 1 TABLET BY MOUTH EVERY DAY IN THE EVENING 8/6/22  Yes Teri Magallon MD   ezetimibe (ZETIA) 10 mg tablet Take 1 tablet (10 mg total) by mouth once daily. 12/3/21  Yes Charo Wright NP   fluticasone propionate (FLONASE) 50 mcg/actuation nasal spray USE 1 SPRAY (50 MCG TOTAL) IN EACH NOSTRIL ONCE DAILY 7/28/22  Yes Sabi Powell NP   levothyroxine (SYNTHROID) 25 MCG tablet TAKE 1 TABLET (25 MCG TOTAL) BY MOUTH BEFORE BREAKFAST. 9/16/22 12/15/22 Yes Teri Magallon MD   nebulizer and compressor (COMP-AIR NEBULIZER COMPRESSOR) Hiral Use as directed 5/17/22  Yes Sabi Powell NP   rosuvastatin (CRESTOR) 20 MG tablet TAKE 1 TABLET BY MOUTH EVERY DAY IN THE EVENING 4/28/22  Yes Charo M. Scheyd, NP   albuterol (ACCUNEB) 1.25 mg/3 mL Nebu Take 6 mLs (2.5 mg total) by nebulization every 6 (six) hours as needed. 5/17/22   Teri Magallon,  "MD   azelastine (ASTELIN) 137 mcg (0.1 %) nasal spray 1 spray (137 mcg total) by Nasal route 2 (two) times daily. 5/17/22 5/17/23  Sabi Powell NP   hydrocortisone 2.5 % cream APPLY TO AFFECTED AREAS TWICE DAILY UP TO 2 WEEKS/MONTH AS NEEDED. 1/17/19   Historical Provider   ketoconazole (NIZORAL) 2 % shampoo Apply topically twice a week.  2/8/19   Historical Provider   triamcinolone acetonide 0.1% (KENALOG) 0.1 % cream 2 (two) times daily as needed. Apply to affected area 9/30/20   Historical Provider       Family History:  Family History   Problem Relation Age of Onset    Heart disease Mother     COPD Father        Social History:  Social History     Tobacco Use    Smoking status: Never    Smokeless tobacco: Never   Substance Use Topics    Alcohol use: Yes     Alcohol/week: 5.0 standard drinks     Types: 5 Glasses of wine per week     Comment: social    Drug use: No       Review of Systems:  ROS      PHYSICAL EXAM     /68 (BP Location: Right arm, Patient Position: Sitting, BP Method: Medium (Manual))   Pulse 70   Temp 98.2 °F (36.8 °C)   Ht 5' 3" (1.6 m)   Wt 60.6 kg (133 lb 11.3 oz)   SpO2 98%   BMI 23.69 kg/m²     GEN - A+OX4, NAD   HEENT - PERRL, EOMI, OP clear. MMM.   Neck - No thyromegaly or cervical LAD. No thyroid masses felt.  CV - RRR, no m/r   Chest - CTAB, no wheezing or rhonchi  Abd - S/NT/ND/+BS.   Ext - 2+BDP and radial pulses. No LE edema.   Neuro - PERRL, EOMI, no nystagmus, eyebrow raise, facial sensation, hearing, m of mastication, smile, palatal raise, shoulder shrug, tongue protrusion symmetric and intact. 5/5 BUE and BLE strength. Sensation to light touch intact throughout. 2+ DTRs. Normal gait.   MSK - No spinal tenderness to palpation. Normal gait.   Skin - No rash.    LABS     Previous labs reviewed.    ASSESSMENT/PLAN     Mery Krueger is a 67 y.o. female with  Mery was seen today for establish care and back pain.    Diagnoses and all orders for this " visit:    Acute left-sided low back pain without sciatica  -     Ambulatory referral/consult to Physical/Occupational Therapy; Future; Expected date: 10/07/2022    Grief  Advised seeing a counsellor. Seems to be normal mourning.     NSTEMI (non-ST elevated myocardial infarction)  On statin and ASA, as well as plavix  Follows with cardiology  Aim for good cholesterol control  Discussed the need for cardio exercise for heart health    Mild intermittent asthma without complication  Stable and asymptomatic   Continue current treatment with albuterol    Coronary artery disease, Stents LAD 12/2018 [place] h/o acute stent thrombosis.  On statin and ASA, as well as plavix  Follows with cardiology  Aim for good cholesterol control  Discussed the need for cardio exercise for heart health    Gastroesophageal reflux disease, unspecified whether esophagitis present  Controlled  Advised avoiding foods known to exacerbate symptoms    Plantar fasciitis  Resolved.   Advised wearing show inserts and what to do if this recurs.       ACP: discussion had about ACP to include definition of ACP, HCP, CODE STATUS. Paperwork handed to patient, to review, discuss with family and return it to clinic to be scanned into the chart.         My total time spent on this encounter was at least 60 minutes which included  the following activities: preparing to see the patient, performing a medically appropriate and/or evaluation, counseling and educating the patient and family/caregiver, ordering medications, tests, or procedures, referring and communicating with other healthcare providers, documenting clinical information in the electronic or other health record. This time is independent and non-overlapping.     RTC in 6 months, sooner if needed and depending on labs.    Destiny Villalta MD  Board Certified Internist/Geriatrician  [facility] ([place])

## 2022-12-09 ENCOUNTER — HOSPITAL ENCOUNTER (OUTPATIENT)
Dept: RADIOLOGY | Facility: HOSPITAL | Age: 67
Discharge: HOME | End: 2022-12-09
Attending: NURSE PRACTITIONER
Payer: MEDICARE

## 2022-12-09 DIAGNOSIS — Z12.31 VISIT FOR SCREENING MAMMOGRAM: ICD-10-CM

## 2022-12-09 PROCEDURE — 77063 MAMMO DIGITAL SCREENING BILAT WITH TOMO: ICD-10-PCS | Mod: 26,,, | Performed by: RADIOLOGY

## 2022-12-09 PROCEDURE — 77063 BREAST TOMOSYNTHESIS BI: CPT | Mod: 26,,, | Performed by: RADIOLOGY

## 2022-12-09 PROCEDURE — 77067 MAMMO DIGITAL SCREENING BILAT WITH TOMO: ICD-10-PCS | Mod: 26,,, | Performed by: RADIOLOGY

## 2022-12-09 PROCEDURE — 77063 BREAST TOMOSYNTHESIS BI: CPT | Mod: TC,PO

## 2022-12-09 PROCEDURE — 77067 SCR MAMMO BI INCL CAD: CPT | Mod: 26,,, | Performed by: RADIOLOGY

## 2022-12-29 RX ORDER — LEVOTHYROXINE SODIUM 25 UG/1
25 TABLET ORAL
Qty: 90 TABLET | Refills: 0 | Status: SHIPPED | OUTPATIENT
Start: 2022-12-29 | End: 2023-06-05

## 2022-12-29 RX ORDER — LEVOTHYROXINE SODIUM 25 UG/1
TABLET ORAL
Qty: 90 TABLET | Refills: 0 | Status: SHIPPED | OUTPATIENT
Start: 2022-12-29 | End: 2022-12-29 | Stop reason: SDUPTHER

## 2023-03-28 ENCOUNTER — OFFICE VISIT (OUTPATIENT)
Dept: PRIMARY CARE CLINIC | Facility: CLINIC | Age: 68
End: 2023-03-28
Payer: MEDICARE

## 2023-03-28 VITALS
TEMPERATURE: 98 F | WEIGHT: 132.06 LBS | HEART RATE: 57 BPM | OXYGEN SATURATION: 97 % | SYSTOLIC BLOOD PRESSURE: 102 MMHG | RESPIRATION RATE: 20 BRPM | DIASTOLIC BLOOD PRESSURE: 64 MMHG | BODY MASS INDEX: 23.4 KG/M2 | HEIGHT: 63 IN

## 2023-03-28 DIAGNOSIS — K21.9 GASTROESOPHAGEAL REFLUX DISEASE, UNSPECIFIED WHETHER ESOPHAGITIS PRESENT: ICD-10-CM

## 2023-03-28 DIAGNOSIS — J45.20 MILD INTERMITTENT ASTHMA WITHOUT COMPLICATION: ICD-10-CM

## 2023-03-28 DIAGNOSIS — R53.83 FATIGUE, UNSPECIFIED TYPE: ICD-10-CM

## 2023-03-28 DIAGNOSIS — I25.84 CORONARY ARTERY DISEASE DUE TO CALCIFIED CORONARY LESION: ICD-10-CM

## 2023-03-28 DIAGNOSIS — L40.59 OTHER PSORIATIC ARTHROPATHY: ICD-10-CM

## 2023-03-28 DIAGNOSIS — M72.2 PLANTAR FASCIITIS: ICD-10-CM

## 2023-03-28 DIAGNOSIS — I25.10 CORONARY ARTERY DISEASE DUE TO CALCIFIED CORONARY LESION: ICD-10-CM

## 2023-03-28 DIAGNOSIS — J30.9 ALLERGIC RHINITIS, UNSPECIFIED SEASONALITY, UNSPECIFIED TRIGGER: ICD-10-CM

## 2023-03-28 DIAGNOSIS — R73.09 ELEVATED GLUCOSE: ICD-10-CM

## 2023-03-28 DIAGNOSIS — F43.21 GRIEF: ICD-10-CM

## 2023-03-28 DIAGNOSIS — I21.4 NSTEMI (NON-ST ELEVATED MYOCARDIAL INFARCTION): ICD-10-CM

## 2023-03-28 DIAGNOSIS — E78.00 PURE HYPERCHOLESTEROLEMIA: ICD-10-CM

## 2023-03-28 DIAGNOSIS — M54.50 ACUTE LEFT-SIDED LOW BACK PAIN WITHOUT SCIATICA: Primary | ICD-10-CM

## 2023-03-28 PROCEDURE — 99999 PR PBB SHADOW E&M-EST. PATIENT-LVL IV: ICD-10-PCS | Mod: PBBFAC,,, | Performed by: INTERNAL MEDICINE

## 2023-03-28 PROCEDURE — 99999 PR PBB SHADOW E&M-EST. PATIENT-LVL IV: CPT | Mod: PBBFAC,,, | Performed by: INTERNAL MEDICINE

## 2023-03-28 PROCEDURE — 99215 OFFICE O/P EST HI 40 MIN: CPT | Mod: S$PBB,,, | Performed by: INTERNAL MEDICINE

## 2023-03-28 PROCEDURE — 99215 PR OFFICE/OUTPT VISIT, EST, LEVL V, 40-54 MIN: ICD-10-PCS | Mod: S$PBB,,, | Performed by: INTERNAL MEDICINE

## 2023-03-28 PROCEDURE — 99214 OFFICE O/P EST MOD 30 MIN: CPT | Mod: PBBFAC,PN | Performed by: INTERNAL MEDICINE

## 2023-03-28 RX ORDER — ACETAMINOPHEN 500 MG
5000 TABLET ORAL DAILY
COMMUNITY

## 2023-03-28 RX ORDER — PANTOPRAZOLE SODIUM 40 MG/1
40 TABLET, DELAYED RELEASE ORAL DAILY
COMMUNITY

## 2023-03-28 NOTE — PROGRESS NOTES
INTERNAL MEDICINE PROGRESS/URGENT CARE NOTE    CHIEF COMPLAINT     Chief Complaint   Patient presents with    Follow-up       HPI     Mery Krueger is a 67 y.o.  female who presents with a PMHx of  asthma, history of MI, GERD, HLD, hypothyroid, arthritis, CAD, who presents today for routine 6 month visit.     Has had a horrible past few weeks. Her  recently  8 weeks before after suffering an MI in his sleep. She has been spending a great deal of time with her daughter who just had a knee surgery. She has been very busy with that.   From a health standpoint, she has been doig well otherwise, with not much notable medical complaints.     CAD with previous NSTEMI: No chest pain. Adequate follow up for the CAD with stents. has had 3 stents. On plavuix for life due to three stents and she threw a clot during her stent procedure.   Does do much cardio exercise . She does do pilates but not much cardio which  we repeatedly spoke about today.     Asthma: only has an exacerbation when she has a cold or something like that.     GERD: had severe symptoms so just did an EGD which showed gastritis which was biopsided. Found to have a duodenal ulcer and polyp which was also biopsied and awaiting results. Due for colonscopy which was scheduled in April. On protonix which has helped her symptoms       Home Medications:  Prior to Admission medications    Medication Sig Start Date End Date Taking? Authorizing Provider   albuterol (VENTOLIN HFA) 90 mcg/actuation inhaler Inhale 2 puffs into the lungs every 6 (six) hours as needed for Wheezing. Rescue 22   Sabi Powell NP   ALPRAZolam (XANAX) 0.5 MG tablet TAKE 1 TABLET (0.5 MG TOTAL) BY MOUTH 3 (THREE) TIMES DAILY AS NEEDED FOR ANXIETY. 9/8/22 10/8/22  Teri Magallon MD   ascorbic acid, vitamin C, (VITAMIN C) 1000 MG tablet Take 1,000 mg by mouth once daily.    Historical Provider   azelastine (ASTELIN) 137 mcg (0.1 %) nasal spray 1 SPRAY (137 MCG TOTAL) BY  "NASAL ROUTE 2 (TWO) TIMES DAILY AS NEEDED (ALLERGY). 8/1/22   Yesi Salgado NP   clopidogreL (PLAVIX) 75 mg tablet TAKE 1 TABLET BY MOUTH EVERY DAY 6/29/22   Charo Wright NP   EScitalopram oxalate (LEXAPRO) 10 MG tablet TAKE 1 TABLET BY MOUTH EVERY DAY IN THE EVENING 11/2/22   Destiny Villalta MD   ezetimibe (ZETIA) 10 mg tablet TAKE 1 TABLET BY MOUTH EVERY DAY 12/6/22   Charo Wright NP   fluticasone propionate (FLONASE) 50 mcg/actuation nasal spray USE 1 SPRAY (50 MCG TOTAL) IN EACH NOSTRIL ONCE DAILY 7/28/22   Sabi Powell NP   hydrocortisone 2.5 % cream APPLY TO AFFECTED AREAS TWICE DAILY UP TO 2 WEEKS/MONTH AS NEEDED. 1/17/19   Historical Provider   ketoconazole (NIZORAL) 2 % shampoo Apply topically twice a week.  2/8/19   Historical Provider   levothyroxine (SYNTHROID) 25 MCG tablet Take 1 tablet (25 mcg total) by mouth before breakfast. 12/29/22   Tomás Tejada MD   nebulizer and compressor (COMP-AIR NEBULIZER COMPRESSOR) Hiral Use as directed 5/17/22   Sabi Powell NP   rosuvastatin (CRESTOR) 20 MG tablet TAKE 1 TABLET BY MOUTH EVERY DAY IN THE EVENING 4/28/22   Charo Wright NP       Review of Systems:  Review of Systems        .AWV last done 3/31/2022. Ordered today and scheduled for June 2023.       PHYSICAL EXAM     /64 (BP Location: Right arm, Patient Position: Sitting, BP Method: Medium (Manual))   Pulse (!) 57   Temp 98.2 °F (36.8 °C) (Oral)   Resp 20   Ht 5' 3" (1.6 m)   Wt 59.9 kg (132 lb 0.9 oz)   SpO2 97%   BMI 23.39 kg/m²     GEN - A+OX4, NAD   HEENT - PERRL, EOMI, OP clear  Neck - No thyromegaly or cervical LAD. No thyroid masses felt.  CV - RRR, no m/r   Chest - CTAB, no wheezing or rhonchi  Abd - S/NT/ND/+BS.   Ext - 2+BDP and radial pulses. No C/C/E.  Skin - No rash.    LABS       ASSESSMENT/PLAN     Mery Krueger is a 68 y.o. female with  1. Acute left-sided low back pain without sciatica  Improved with her pilates and core building excercises "   Tylenol as needed    2. Grief  Overview:  Doing great. Has a wonderful support system of friends and family  shes done remarkably well.       3. NSTEMI (non-ST elevated myocardial infarction)  Overview:  S/p 3 stents  Follows with cardiology. Scheduled for her cardiac testing.   On statin and plavix   Advised doing more cardio    Orders:  -     LIPID PANEL; Future; Expected date: 03/28/2023    4. Mild intermittent asthma without complication  Overview:  Controlled . Prn albuterol  Avoid triggers and allergens      5. Coronary artery disease, Stents LAD 12/2018 [place] h/o acute stent thrombosis.  Overview:  LAD stent. 3 stents  Follows with cardiology. Scheduled for her cardiac testing.   On statin and plavix   Advised doing more cardio      6. Pure hypercholesterolemia  Overview:  Pending lipid panel  Continue with statin. And zetia  Continue with cardiology.   Limit cholesterol and sweets in diet. Advised healthy eating out      7. Gastroesophageal reflux disease, unspecified whether esophagitis present  Overview:  S/p recent EGD with gastritic, duodenal ulcers.   On protonix and better  Discussed lifestyle changes and foods to limit       8. Plantar fasciitis  Overview:  Resolved  Advised arches in her shoes         9. Other psoriatic arthropathy  Controlled with pilates and exercise     10. Elevated glucose  -     Hemoglobin A1C; Future; Expected date: 03/28/2023  -     Basic Metabolic Panel; Future; Expected date: 03/28/2023    11. Fatigue, unspecified type  -     TSH; Future; Expected date: 03/28/2023    12. Allergic rhinitis, unspecified seasonality, unspecified trigger  Overview:  Very well controlled  On azelastin  Avoid allergens       My total time spent on this encounter was 42 minutes which included  the following activities: preparing to see the patient, performing a medically appropriate and/or evaluation, counseling and educating the patient and family/caregiver, ordering medications, tests, or  procedures, referring and communicating with other healthcare providers, documenting clinical information in the electronic or other health record. This time is independent and non-overlapping.           WORRY SCORE 2,CAD with MI     RTC in 6 months, sooner if needed and depending on labs.    Destiny Villalta MD  Board Certified Internist/Geriatrician  [facility]

## 2023-04-13 ENCOUNTER — TELEPHONE (OUTPATIENT)
Dept: PRIMARY CARE CLINIC | Facility: CLINIC | Age: 68
End: 2023-04-13

## 2023-04-13 NOTE — TELEPHONE ENCOUNTER
Pt was notified of her lab results. She verbalized understanding. She says she had been off her cholesterol medication for several weeks prior to having her labs done. She resumed taking it immediately after seeing her results online. She will see her cardiologist again on 5/10/23.       Electronically signed by Shahriar Pierce RN at 4/13/2023  3:50

## 2023-04-13 NOTE — TELEPHONE ENCOUNTER
Pt was notified of her lab results. She verbalized understanding. She says she had been off her cholesterol medication for several weeks prior to having her labs done. She resumed taking it immediately after seeing her results online. She will see her cardiologist again on 5/10/23.

## 2023-04-13 NOTE — TELEPHONE ENCOUNTER
----- Message from Destiny Villalta MD sent at 4/6/2023  4:13 PM CDT -----  Cholesterol is not at goal. I do advise upping the crestor dose to lower this   BMP shows normal kidney function, normal electrolytes.   Hgb a1c is normal so normal glucose management  Thyroid function is in the normal range   Overall, labs look good.

## 2023-04-24 LAB — CRC RECOMMENDATION EXT: NORMAL

## 2023-05-05 ENCOUNTER — TELEPHONE (OUTPATIENT)
Dept: FAMILY MEDICINE | Facility: CLINIC | Age: 68
End: 2023-05-05
Payer: MEDICARE

## 2023-05-09 ENCOUNTER — PATIENT MESSAGE (OUTPATIENT)
Dept: PRIMARY CARE CLINIC | Facility: CLINIC | Age: 68
End: 2023-05-09
Payer: MEDICARE

## 2023-06-03 NOTE — TELEPHONE ENCOUNTER
Refill Routing Note   Refill Routing Note   Medication(s) are not appropriate for processing by [facility] for the following reason(s):      Non-participating provider    [facility] action(s):  Route None identified        Medication reconciliation completed: No     Appointments  past 12m or future 3m with PCP    Date Provider   Last Visit   3/28/2023 Destiny Villalta MD   Next Visit   9/12/2023 Destiny Villalta MD   ED visits in past 90 days: 0        Note composed:11:10 AM 06/03/2023

## 2023-06-05 RX ORDER — LEVOTHYROXINE SODIUM 25 UG/1
TABLET ORAL
Qty: 90 TABLET | Refills: 1 | Status: SHIPPED | OUTPATIENT
Start: 2023-06-05 | End: 2023-11-29

## 2023-06-14 RX ORDER — ESCITALOPRAM OXALATE 10 MG/1
TABLET ORAL
Qty: 90 TABLET | Refills: 1 | Status: SHIPPED | OUTPATIENT
Start: 2023-06-14 | End: 2023-12-21

## 2023-07-03 PROBLEM — I21.4 NSTEMI (NON-ST ELEVATED MYOCARDIAL INFARCTION): Status: RESOLVED | Noted: 2018-12-19 | Resolved: 2023-07-03

## 2023-07-06 ENCOUNTER — PATIENT OUTREACH (OUTPATIENT)
Dept: PRIMARY CARE CLINIC | Facility: CLINIC | Age: 68
End: 2023-07-06
Payer: MEDICARE

## 2023-09-19 ENCOUNTER — OFFICE VISIT (OUTPATIENT)
Dept: PRIMARY CARE CLINIC | Facility: CLINIC | Age: 68
End: 2023-09-19
Payer: MEDICARE

## 2023-09-19 ENCOUNTER — PATIENT MESSAGE (OUTPATIENT)
Dept: PRIMARY CARE CLINIC | Facility: CLINIC | Age: 68
End: 2023-09-19

## 2023-09-19 VITALS
DIASTOLIC BLOOD PRESSURE: 68 MMHG | HEART RATE: 68 BPM | WEIGHT: 136.13 LBS | BODY MASS INDEX: 24.12 KG/M2 | HEIGHT: 63 IN | TEMPERATURE: 98 F | RESPIRATION RATE: 18 BRPM | SYSTOLIC BLOOD PRESSURE: 110 MMHG | OXYGEN SATURATION: 97 %

## 2023-09-19 DIAGNOSIS — L40.50 PSORIATIC ARTHRITIS: ICD-10-CM

## 2023-09-19 DIAGNOSIS — J30.9 ALLERGIC RHINITIS, UNSPECIFIED SEASONALITY, UNSPECIFIED TRIGGER: ICD-10-CM

## 2023-09-19 DIAGNOSIS — R53.83 FATIGUE, UNSPECIFIED TYPE: ICD-10-CM

## 2023-09-19 DIAGNOSIS — K21.9 GASTROESOPHAGEAL REFLUX DISEASE, UNSPECIFIED WHETHER ESOPHAGITIS PRESENT: ICD-10-CM

## 2023-09-19 DIAGNOSIS — M54.50 ACUTE LEFT-SIDED LOW BACK PAIN WITHOUT SCIATICA: Primary | ICD-10-CM

## 2023-09-19 DIAGNOSIS — J45.20 MILD INTERMITTENT ASTHMA WITHOUT COMPLICATION: ICD-10-CM

## 2023-09-19 DIAGNOSIS — I25.84 CORONARY ARTERY DISEASE DUE TO CALCIFIED CORONARY LESION: ICD-10-CM

## 2023-09-19 DIAGNOSIS — I25.10 CORONARY ARTERY DISEASE DUE TO CALCIFIED CORONARY LESION: ICD-10-CM

## 2023-09-19 DIAGNOSIS — E78.00 PURE HYPERCHOLESTEROLEMIA: ICD-10-CM

## 2023-09-19 DIAGNOSIS — Z78.0 MENOPAUSE: ICD-10-CM

## 2023-09-19 DIAGNOSIS — R73.09 ELEVATED GLUCOSE: ICD-10-CM

## 2023-09-19 PROCEDURE — 99999 PR PBB SHADOW E&M-EST. PATIENT-LVL IV: ICD-10-PCS | Mod: PBBFAC,,, | Performed by: INTERNAL MEDICINE

## 2023-09-19 PROCEDURE — 99214 OFFICE O/P EST MOD 30 MIN: CPT | Mod: S$PBB,,, | Performed by: INTERNAL MEDICINE

## 2023-09-19 PROCEDURE — 99214 PR OFFICE/OUTPT VISIT, EST, LEVL IV, 30-39 MIN: ICD-10-PCS | Mod: S$PBB,,, | Performed by: INTERNAL MEDICINE

## 2023-09-19 PROCEDURE — 99999 PR PBB SHADOW E&M-EST. PATIENT-LVL IV: CPT | Mod: PBBFAC,,, | Performed by: INTERNAL MEDICINE

## 2023-09-19 PROCEDURE — 99214 OFFICE O/P EST MOD 30 MIN: CPT | Mod: PBBFAC,PN | Performed by: INTERNAL MEDICINE

## 2023-09-19 NOTE — PROGRESS NOTES
INTERNAL MEDICINE PROGRESS/URGENT CARE NOTE    CHIEF COMPLAINT     Chief Complaint   Patient presents with    Follow-up     Discuss recent diagnosis of psoriaticarthritis from dermatologist and discuss specialist she should see to take over treatment   Discuss hair loss  Hot flashes and sweating profusely from minimum activity   Discuss biotin for hair loss        HPI     Mery Krueger is a very jovi 67 y.o.  female who presents with a PMHx of  asthma, history of MI, GERD, HLD, hypothyroid, arthritis, CAD, who presents today for routine 6-month follow up visit.       Within the past year, her  recently   after suffering an MI in his sleep. She has been spending a great deal of time with her daughter who just had a knee surgery. She has been very busy with that.   From a health standpoint, she has been doig well otherwise, with not much notable medical complaints.   No chest pain. Adequate follow up for the CAD with stents.     Her main issues and complaits today are:   Hair loss: recently saw a dermatologist who started her on nutrofol. As well as recommended biotin.   Excessive sweating. Says shes normally a sweater but it seems to have worsened. No other hormonal symptoms. No symptoms consistent with thyroid disorder and it had been checked less than 6 months ago and was wnl.   Dermatologist diagnosed her with psociatic arthritis and started her on a an immunologic medication which shes says helped with her psoriasis but did not help wi th her  joint pains, thinks the recent shot may have even worsened this.     CAD/History of NSTEMI with stents: on zetia. On plavix and crestor 20mg daily.   Asymptomatic  Regarding diet and exercise, she reports she does exercise consistently. Not less active   Follows closely with a cardiologist.           Home Medications:  Prior to Admission medications    Medication Sig Start Date End Date Taking? Authorizing Provider   albuterol (VENTOLIN HFA) 90  mcg/actuation inhaler Inhale 2 puffs into the lungs every 6 (six) hours as needed for Wheezing. Rescue 5/17/22   Sabi Powell NP   ALPRAZolam (XANAX) 0.5 MG tablet TAKE 1 TABLET (0.5 MG TOTAL) BY MOUTH 3 (THREE) TIMES DAILY AS NEEDED FOR ANXIETY. 9/8/22 10/8/22  Teri Magallon MD   ascorbic acid, vitamin C, (VITAMIN C) 1000 MG tablet Take 1,000 mg by mouth once daily.    Provider, Historical   azelastine (ASTELIN) 137 mcg (0.1 %) nasal spray 1 SPRAY (137 MCG TOTAL) BY NASAL ROUTE 2 (TWO) TIMES DAILY AS NEEDED (ALLERGY). 8/1/22   Yesi Salgado NP   cholecalciferol, vitamin D3, (VITAMIN D3) 125 mcg (5,000 unit) Tab Take 5,000 Units by mouth once daily.    Provider, Historical   clopidogreL (PLAVIX) 75 mg tablet TAKE 1 TABLET BY MOUTH EVERY DAY 6/29/22   Charo Wright NP   EScitalopram oxalate (LEXAPRO) 10 MG tablet TAKE 1 TABLET BY MOUTH EVERY DAY IN THE EVENING 6/14/23   Destiny Villalta MD   ezetimibe (ZETIA) 10 mg tablet TAKE 1 TABLET BY MOUTH EVERY DAY  Patient not taking: Reported on 3/28/2023 12/6/22   Charo Wright NP   guselkumab (TREMFYA) 100 mg/mL AtIn Inject one (100 mg) at week 0, one (100 mg) at week 4, then one (100 mg) every 8 weeks thereafter. 8/8/23   Shannon Newby MD   hydrocortisone 2.5 % cream APPLY TO AFFECTED AREAS TWICE DAILY UP TO 2 WEEKS/MONTH AS NEEDED. 1/17/19   Provider, Historical   ketoconazole (NIZORAL) 2 % cream Apply topically 2 (two) times daily. Under breasts, mix with triamcinolone, until improved, then can stop and restart as needed  Patient not taking: Reported on 8/8/2023 4/6/23   Shannon Newby MD   levothyroxine (SYNTHROID) 25 MCG tablet TAKE 1 TABLET BY MOUTH BEFORE BREAKFAST. 6/5/23   Destiny Villalta MD   nebulizer and compressor (COMP-AIR NEBULIZER COMPRESSOR) Hiral Use as directed 5/17/22   Sabi Powell, NP   pantoprazole (PROTONIX) 40 MG tablet Take 40 mg by mouth once daily.    Provider, Historical   rosuvastatin (CRESTOR) 20 MG  "tablet TAKE 1 TABLET BY MOUTH EVERY DAY IN THE EVENING 4/28/22   Charo Wright, NP   triamcinolone acetonide 0.1% (KENALOG) 0.1 % cream Apply twice daily to affected areas of skin until improved, then can stop and restart as needed 4/6/23   Shannon Newby MD       Review of Systems:  Review of Systems      Advance Care Planning     Date: 09/19/2023    Power of   I initiated the process of voluntary advance care planning today and explained the importance of this process to the patient.  I introduced the concept of advance directives to the patient, as well. Then the patient received detailed information about the importance of designating a Health Care Power of  (HCPOA). She was also instructed to communicate with this person about their wishes for future healthcare, should she become sick and lose decision-making capacity. The patient has not previously appointed a HCPOA. After our discussion, the patient has decided to complete a HCPOA and has appointed her daughter, health care agent:  name on file  & health care agent number:  on file  I spent a total time of 10 minutes discussing this issue with the patient.            PHYSICAL EXAM     /68 (BP Location: Left arm, Patient Position: Sitting, BP Method: Medium (Manual))   Pulse 68   Temp 97.8 °F (36.6 °C) (Other (see comments))   Resp 18   Ht 5' 3" (1.6 m)   Wt 61.7 kg (136 lb 2.1 oz)   SpO2 97%   BMI 24.12 kg/m²     GEN - A+OX4, NAD   HEENT - PERRL, EOMI, OP clear  Neck - No thyromegaly or cervical LAD. No thyroid masses felt.  CV - RRR, no m/r   Chest - CTAB, no wheezing or rhonchi  Abd - S/NT/ND/+BS.   Ext - 2+BDP and radial pulses. No C/C/E.  Skin - No rash.    LABS       ASSESSMENT/PLAN     Mery Krueger is a 68 y.o. female with  1. Acute left-sided low back pain without sciatica  Stable     2. Gastroesophageal reflux disease, unspecified whether esophagitis present  Overview:  S/p recent EGD with gastritic, duodenal " ulcers.   On protonix and better  Discussed lifestyle changes and foods to limit       3. Pure hypercholesterolemia  Overview:  Pending lipid panel  Continue with statin. And zetia  Continue with cardiology.   Limit cholesterol and sweets in diet. Advised healthy eating out    Orders:  -     Lipid Panel; Future; Expected date: 09/19/2023    4. Coronary artery disease, Stents LAD 12/2018 [place] h/o acute stent thrombosis.  Overview:  LAD stent. 3 stents  Follows with cardiology. Scheduled for her cardiac testing.   On statin and plavix   Advised doing more cardio      5. Mild intermittent asthma without complication  Overview:  Controlled . Prn albuterol  Avoid triggers and allergens      6. Elevated glucose  Normalized with diet     7. Allergic rhinitis, unspecified seasonality, unspecified trigger  Overview:  Very well controlled  On azelastin  Avoid allergens      8. Fatigue, unspecified type  -     CBC Auto Differential; Future; Expected date: 09/19/2023  -     Comprehensive Metabolic Panel; Future; Expected date: 09/19/2023  -     TSH; Future; Expected date: 09/19/2023    9. Menopause  -     DXA Bone Density Axial Skeleton 1 or more sites; Future; Expected date: 09/19/2023    10. Psoriatic arthritis  -     Ambulatory referral/consult to Rheumatology; Future; Expected date: 09/26/2023           WORRY SCORE 1    RTC in 6 months, sooner if needed and depending on labs.    Destiny Villalta MD  Board Certified Internist/Geriatrician  [facility] ([place])

## 2023-10-03 ENCOUNTER — TELEPHONE (OUTPATIENT)
Dept: PRIMARY CARE CLINIC | Facility: CLINIC | Age: 68
End: 2023-10-03
Payer: MEDICARE

## 2023-10-03 ENCOUNTER — HOSPITAL ENCOUNTER (OUTPATIENT)
Dept: RADIOLOGY | Facility: HOSPITAL | Age: 68
Discharge: HOME | End: 2023-10-03
Attending: INTERNAL MEDICINE
Payer: MEDICARE

## 2023-10-03 DIAGNOSIS — Z78.0 MENOPAUSE: ICD-10-CM

## 2023-10-03 PROCEDURE — 77080 DXA BONE DENSITY AXIAL SKELETON 1 OR MORE SITES: ICD-10-PCS | Mod: 26,,, | Performed by: RADIOLOGY

## 2023-10-03 PROCEDURE — 77080 DXA BONE DENSITY AXIAL: CPT | Mod: TC,PO

## 2023-10-03 PROCEDURE — 77080 DXA BONE DENSITY AXIAL: CPT | Mod: 26,,, | Performed by: RADIOLOGY

## 2023-10-03 NOTE — TELEPHONE ENCOUNTER
Patient was informed of her bone density results and Dr. Rodriguez's recommendations. She verbalized understanding. Patient has some reservations to starting treatment along with questions she would like to discuss with Dr. Villalta. Patient scheduled for an office visit on 10/10/23 to discuss bone density results and treatment.

## 2023-10-03 NOTE — TELEPHONE ENCOUNTER
Please call and notify pt:  Bone density shows osteopenia but when taking other risk factors into account, her risk of having a major broken bone in the next 10 yrs is 34%, which ends up qualifying her in the osteoporosis range. We usually recommend treatment at this point with oral medicines such as Fosamax or Boniva. There are potential side effects including reflux, bone pains but rare and severe side effects would be wearing away of the jaw. She needs to see her dentist to make sure no major dental work needs to be done as we recommend getting that done before starting this medicine. If she has further questions, please schedule appt w/ Dr. Villalta to further discuss.

## 2023-10-10 ENCOUNTER — OFFICE VISIT (OUTPATIENT)
Dept: PRIMARY CARE CLINIC | Facility: CLINIC | Age: 68
End: 2023-10-10
Payer: MEDICARE

## 2023-10-10 VITALS
DIASTOLIC BLOOD PRESSURE: 72 MMHG | HEART RATE: 67 BPM | WEIGHT: 137 LBS | OXYGEN SATURATION: 97 % | TEMPERATURE: 98 F | BODY MASS INDEX: 24.27 KG/M2 | SYSTOLIC BLOOD PRESSURE: 114 MMHG | RESPIRATION RATE: 18 BRPM | HEIGHT: 63 IN

## 2023-10-10 DIAGNOSIS — M85.80 OSTEOPENIA, UNSPECIFIED LOCATION: Primary | ICD-10-CM

## 2023-10-10 PROCEDURE — 99999 PR PBB SHADOW E&M-EST. PATIENT-LVL V: CPT | Mod: PBBFAC,,, | Performed by: INTERNAL MEDICINE

## 2023-10-10 PROCEDURE — 99212 PR OFFICE/OUTPT VISIT, EST, LEVL II, 10-19 MIN: ICD-10-PCS | Mod: S$PBB,,, | Performed by: INTERNAL MEDICINE

## 2023-10-10 PROCEDURE — 99212 OFFICE O/P EST SF 10 MIN: CPT | Mod: S$PBB,,, | Performed by: INTERNAL MEDICINE

## 2023-10-10 PROCEDURE — 99215 OFFICE O/P EST HI 40 MIN: CPT | Mod: PBBFAC,PN | Performed by: INTERNAL MEDICINE

## 2023-10-10 PROCEDURE — 99999 PR PBB SHADOW E&M-EST. PATIENT-LVL V: ICD-10-PCS | Mod: PBBFAC,,, | Performed by: INTERNAL MEDICINE

## 2023-10-10 NOTE — PROGRESS NOTES
"INTERNAL MEDICINE PROGRESS/URGENT CARE NOTE    CHIEF COMPLAINT     Chief Complaint   Patient presents with    Follow-up     Discuss bone density results potential treatment        HPI     Mery Krueger is a 68 y.o.  female who presents for an urgent/follow up visit today.  Here to discuss results of her recent bone density.   Which shows "There is a 34% risk of a major osteoporotic fracture and a 4.5% risk of hip fracture in the next 10 years (FRAX).     Osteopenia"    Today, had the discussion re the possible need for bone treatment and she says she would like to maintain the vitamin D, was told to not take calcium due to her heart stents etc and will do her exercises.        Also had a question about the vitamin infusion places. Advised patient to get her vitamins naturally.     Past Medical History:  Past Medical History:   Diagnosis Date    Arthritis     Asthma     Coronary artery disease     Dr. Pereira    GERD (gastroesophageal reflux disease)     Hyperlipidemia     Thyroid disease        Home Medications:  Prior to Admission medications    Medication Sig Start Date End Date Taking? Authorizing Provider   albuterol (VENTOLIN HFA) 90 mcg/actuation inhaler Inhale 2 puffs into the lungs every 6 (six) hours as needed for Wheezing. Rescue 5/17/22   Sabi Powell NP   ALPRAZolam (XANAX) 0.5 MG tablet TAKE 1 TABLET (0.5 MG TOTAL) BY MOUTH 3 (THREE) TIMES DAILY AS NEEDED FOR ANXIETY. 9/8/22 9/19/23  Teri Magallon MD   ascorbic acid, vitamin C, (VITAMIN C) 1000 MG tablet Take 1,000 mg by mouth once daily.    Provider, Historical   azelastine (ASTELIN) 137 mcg (0.1 %) nasal spray 1 SPRAY (137 MCG TOTAL) BY NASAL ROUTE 2 (TWO) TIMES DAILY AS NEEDED (ALLERGY). 8/1/22   Yesi Salgado NP   cholecalciferol, vitamin D3, (VITAMIN D3) 125 mcg (5,000 unit) Tab Take 5,000 Units by mouth once daily.    Provider, Historical   clopidogreL (PLAVIX) 75 mg tablet TAKE 1 TABLET BY MOUTH EVERY DAY 6/29/22   Charo Wright " "MLarissa, ABDIRAHMAN   EScitalopram oxalate (LEXAPRO) 10 MG tablet TAKE 1 TABLET BY MOUTH EVERY DAY IN THE EVENING 6/14/23   Destiny Villalta MD   ezetimibe (ZETIA) 10 mg tablet TAKE 1 TABLET BY MOUTH EVERY DAY 12/6/22   Charo Wright NP   guselkumab (TREMFYA) 100 mg/mL AtIn Inject one (100 mg) at week 0, one (100 mg) at week 4, then one (100 mg) every 8 weeks thereafter. 8/8/23   Shannon Newby MD   hydrocortisone 2.5 % cream APPLY TO AFFECTED AREAS TWICE DAILY UP TO 2 WEEKS/MONTH AS NEEDED. 1/17/19   Provider, Historical   ketoconazole (NIZORAL) 2 % cream Apply topically 2 (two) times daily. Under breasts, mix with triamcinolone, until improved, then can stop and restart as needed 4/6/23   Shannon Newby MD   levothyroxine (SYNTHROID) 25 MCG tablet TAKE 1 TABLET BY MOUTH BEFORE BREAKFAST. 6/5/23   Destiny Villalta MD   nebulizer and compressor (COMP-AIR NEBULIZER COMPRESSOR) Hiral Use as directed 5/17/22   Sabi Powell NP   pantoprazole (PROTONIX) 40 MG tablet Take 40 mg by mouth once daily.    Provider, Historical   rosuvastatin (CRESTOR) 20 MG tablet TAKE 1 TABLET BY MOUTH EVERY DAY IN THE EVENING 4/28/22   Charo Wright NP   triamcinolone acetonide 0.1% (KENALOG) 0.1 % cream Apply twice daily to affected areas of skin until improved, then can stop and restart as needed 4/6/23   Shannon Newby MD       Review of Systems:  Review of Systems        PHYSICAL EXAM     /72 (BP Location: Right arm, Patient Position: Sitting, BP Method: Medium (Manual))   Pulse 67   Temp 98 °F (36.7 °C) (Oral)   Resp 18   Ht 5' 3" (1.6 m)   Wt 62.1 kg (137 lb 0.3 oz)   SpO2 97%   BMI 24.27 kg/m²       LABS         ASSESSMENT/PLAN     Mery Krueger is a 68 y.o. female with  1. Osteopenia, unspecified location  With elevated FRAX score. Discussed option to use pharmacological therapy and patient declines today. Will instead think about it. She does have a lot of gum and dental issues and constantly " needs dental work including surgery so is hesitant to start medications given the possible side effect of ostenecrosis of the jaw etc.   Will continue with vitamin D and calcium from diet, was instructed by her cardiologist to not take calcium   Also advised weight bearing excercises and fall prevention        RTC as scheduled     Destiny Villalta MD  Board Certified Internist/Geriatrician  [facility] ([place])

## 2023-11-14 ENCOUNTER — PATIENT MESSAGE (OUTPATIENT)
Dept: OBSTETRICS AND GYNECOLOGY | Facility: CLINIC | Age: 68
End: 2023-11-14
Payer: MEDICARE

## 2023-11-14 DIAGNOSIS — R74.8 ELEVATED LIVER ENZYMES: Primary | ICD-10-CM

## 2023-11-15 ENCOUNTER — PATIENT MESSAGE (OUTPATIENT)
Dept: ADMINISTRATIVE | Facility: HOSPITAL | Age: 68
End: 2023-11-15
Payer: MEDICARE

## 2023-11-15 ENCOUNTER — TELEPHONE (OUTPATIENT)
Dept: PRIMARY CARE CLINIC | Facility: CLINIC | Age: 68
End: 2023-11-15
Payer: MEDICARE

## 2023-11-15 NOTE — TELEPHONE ENCOUNTER
Patient was notified of her lab results. She had already scheduled her liver us for tomorrow morning 11/16/23. Also, she saw her cardiologist today and was told to hold her ezetimibe. Her lipids will be checked again in 3 months.

## 2023-11-15 NOTE — TELEPHONE ENCOUNTER
----- Message from Destiny Villalta MD sent at 11/15/2023  7:42 AM CST -----  I sure did lol . Thank you

## 2023-11-16 ENCOUNTER — PATIENT OUTREACH (OUTPATIENT)
Dept: ADMINISTRATIVE | Facility: HOSPITAL | Age: 68
End: 2023-11-16
Payer: MEDICARE

## 2023-11-16 ENCOUNTER — TELEPHONE (OUTPATIENT)
Dept: PRIMARY CARE CLINIC | Facility: CLINIC | Age: 68
End: 2023-11-16
Payer: MEDICARE

## 2023-11-16 ENCOUNTER — HOSPITAL ENCOUNTER (OUTPATIENT)
Dept: RADIOLOGY | Facility: HOSPITAL | Age: 68
Discharge: HOME | End: 2023-11-16
Attending: INTERNAL MEDICINE
Payer: MEDICARE

## 2023-11-16 DIAGNOSIS — R74.8 ELEVATED LIVER ENZYMES: ICD-10-CM

## 2023-11-16 DIAGNOSIS — Z12.31 ENCOUNTER FOR SCREENING MAMMOGRAM FOR MALIGNANT NEOPLASM OF BREAST: Primary | ICD-10-CM

## 2023-11-16 PROCEDURE — 76705 ECHO EXAM OF ABDOMEN: CPT | Mod: 26,,, | Performed by: RADIOLOGY

## 2023-11-16 PROCEDURE — 76705 ECHO EXAM OF ABDOMEN: CPT | Mod: TC,PO

## 2023-11-16 PROCEDURE — 76705 US ABDOMEN LIMITED_LIVER: ICD-10-PCS | Mod: 26,,, | Performed by: RADIOLOGY

## 2023-11-16 NOTE — PROGRESS NOTES
Population Health Chart Review & Patient Outreach Details    Outreach Performed: YES Telephone Successful    Additional Pop Health Notes:           Updates Requested / Reviewed:      Updated Care Coordination Note and Immunizations Reconciliation Completed or Queried: [place]         Health Maintenance Topics Overdue:    Health Maintenance Due   Topic Date Due    RSV Vaccine (Age 60+) (1 - 1-dose 60+ series) Never done    TETANUS VACCINE  09/15/2015    Shingles Vaccine (2 of 3) 01/04/2016    Pneumococcal Vaccines (Age 65+) (2 - PPSV23 or PCV20) 12/02/2020    COVID-19 Vaccine (4 - 2023-24 season) 09/01/2023    Mammogram  12/09/2023         Health Maintenance Topic(s) Outreach Outcomes & Actions Taken:    Breast Cancer Screening - Outreach Outcomes & Actions Taken  : Pt unsure if she will schedule with [facility] or [facility].  She will call back to schedule or have order sent to [facility] when she decides.

## 2023-11-16 NOTE — TELEPHONE ENCOUNTER
----- Message from Destiny Villalta MD sent at 11/16/2023 11:50 AM CST -----  Liver US shows no abnormal fidings. Will continue to monitor enzymes

## 2023-11-24 ENCOUNTER — PATIENT MESSAGE (OUTPATIENT)
Dept: PRIMARY CARE CLINIC | Facility: CLINIC | Age: 68
End: 2023-11-24
Payer: MEDICARE

## 2023-11-29 RX ORDER — LEVOTHYROXINE SODIUM 25 UG/1
25 TABLET ORAL
Qty: 90 TABLET | Refills: 1 | Status: SHIPPED | OUTPATIENT
Start: 2023-11-29

## 2023-12-13 ENCOUNTER — HOSPITAL ENCOUNTER (OUTPATIENT)
Dept: RADIOLOGY | Facility: HOSPITAL | Age: 68
Discharge: HOME | End: 2023-12-13
Attending: NURSE PRACTITIONER
Payer: MEDICARE

## 2023-12-13 PROCEDURE — 77063 MAMMO DIGITAL SCREENING BILAT WITH TOMO: ICD-10-PCS | Mod: 26,,, | Performed by: RADIOLOGY

## 2023-12-13 PROCEDURE — 77067 SCR MAMMO BI INCL CAD: CPT | Mod: 26,,, | Performed by: RADIOLOGY

## 2023-12-13 PROCEDURE — 77067 MAMMO DIGITAL SCREENING BILAT WITH TOMO: ICD-10-PCS | Mod: 26,,, | Performed by: RADIOLOGY

## 2023-12-13 PROCEDURE — 77067 SCR MAMMO BI INCL CAD: CPT | Mod: TC,PO

## 2023-12-13 PROCEDURE — 77063 BREAST TOMOSYNTHESIS BI: CPT | Mod: 26,,, | Performed by: RADIOLOGY

## 2023-12-21 RX ORDER — ESCITALOPRAM OXALATE 10 MG/1
TABLET ORAL
Qty: 90 TABLET | Refills: 1 | Status: SHIPPED | OUTPATIENT
Start: 2023-12-21

## 2024-01-11 DIAGNOSIS — Z00.00 ENCOUNTER FOR MEDICARE ANNUAL WELLNESS EXAM: ICD-10-CM

## 2024-02-05 NOTE — PROGRESS NOTES
Subjective:      Patient ID: Mery Krueger is a 69 y.o. female.    Chief Complaint: Disease Management    HPI    Rheumatologic History:     - Diagnosis/es:   - Psoriasis diagnosed in  by Dr Fernandez   - Osteopenia with elevated FRAX  - Family History: No autoimmune conditions  - Obstetric History:  (no complications and born full term)   - Positive serologies:  COLLIN 1:40 dense fine speckled, smooth muscle (1:40)  - Negative serologies:  RF, CCP, SSA, SSB, dsDNA  - Infectious screening labs: Negative T spot (2023)   - Imaging:   - DEXA (10/2023) osteopenia with 34% risk of a major osteoporotic fracture and a 4.5% risk of hip fracture in the next 10 years (FRAX).    - Xray left ankle (2015) calcaneal enthesophyte   - Xray bilateral elbows (10/2021) prominent spur, right elbow   - Xray right foot (10/2021) There is cortical irregularity noted at the right 2nd DIP joint, possibly the result of previous trauma or infection.  Please correlate clinically.  There is soft tissue swelling about the right 2nd toe.     - Previous Treatments:   - Topical meds  - Current Treatments:   - Tremfya (took only 1 dose , insurance would not cover it; in 2023 she took 2 shots which cleared it up again)    Interval History:   This is a 69-year-old woman with history of HLD, CAD s/p stent placement x 3 (), GERD, allergic rhinitis, right ankle fracture s/p repair () osteopenia with elevated FRAX not on medication, right CTS, and psoriasis on Tremfya who was last seen by Dr Viramontes in 2021 and subsequently lost to follow up. She reports migratory arthralgia that began many years ago before the onset of her psoriasis. She reports pain in her left knee and left elbow and occasionally both hips, as well as hair thinning.  Initially took 1 dose of sample Tremfya in  which cleared up her rashes and helped with her joint pain.  Insurance would not cover Tremfya as maintenance medication, so subsequently she went  "off DMARD therapy until August 20, 2023 when she took another 2 sample shots which did clear up her rashes, but did not help with her arthralgia.    The patient denies fevers, oral/nasal ulcers, sicca symptoms, rashes, photosensitivity, joint swelling, dactylitis, pleuritic chest pain, shortness of breath, cough, abdominal pain, diarrhea, bloody stool, inflammatory back pain, weakness, numbness/tingling, and Raynaud's.     Objective:   /70   Pulse 78   Ht 5' 3" (1.6 m)   Wt 63 kg (138 lb 14.2 oz)   BMI 24.60 kg/m²   Physical Exam   Constitutional: normal appearance.   HENT:   Head: Normocephalic and atraumatic.   Cardiovascular: Normal rate, regular rhythm and normal heart sounds.   Pulmonary/Chest: Effort normal and breath sounds normal.   Musculoskeletal:      Comments: No synovitis, dactylitis, enthesitis, effusions     Neurological: She is alert.   Skin: Skin is warm and dry. No rash noted.   No skin thickening, telangiectasias, calcinosis, psoriasiform lesions, lupoid lesions       No data to display     Labs independently reviewed by me (12/14/2023)  CBC WNL   CMP CR, LFTs WNL     Assessment:     1. Migratory polyarthritis    2. Plantar fasciitis    3. Psoriasis    4. Osteopenia after menopause    5. Fracture Risk Assessment Score (FRAX) indicating greater than 20% risk for major osteoporosis-related fracture      This is a 69-year-old woman with history of HLD, CAD s/p stent placement x 3 (2018), GERD, allergic rhinitis, right ankle fracture s/p repair (1998) osteopenia with elevated FRAX not on medication, right CTS, and psoriasis on Tremfya who was last seen by Dr Viramontes in 2/2021 and subsequently lost to follow up. She reports migratory arthralgia that began many years ago before the onset of her psoriasis. She reports pain in her left knee and left elbow and occasionally both hips, as well as hair thinning. Initially, she took 1 dose of sample Tremfya in 2022 which cleared up her rashes and " helped with her joint pain.  Insurance would not cover Tremfya as maintenance medication, so subsequently she went off DMARD therapy until August 20, 2023 when she took another 2 sample shots which did clear up her rashes, but did not help with her arthralgia.  I do not find active inflammatory arthritis, nail dystrophy, or psoriasis on exam today.    She does not meet CASPAR criteria at this time, but I discussed that some of her symptoms are suspicious for psoriatic arthritis.  Dermatology may consider starting her on a maintenance medication that is FDA approved for both psoriasis and psoriatic arthritis such as Otezla for example for now.    In terms of her osteopenia with elevated FRAX, I discussed her risk of fracture that I recommend starting therapy for osteoporosis.  She declined.    Plan:     Problem List Items Addressed This Visit          Orthopedic    Plantar fasciitis    Relevant Orders    HLA B27 Antigen    CBC W/ AUTO DIFFERENTIAL    Comprehensive Metabolic Panel    Sedimentation rate    C-REACTIVE PROTEIN    XR Arthritis Survey    X-Ray Elbow 2 Views Left    Psoriatic arthritis - Primary     Other Visit Diagnoses       Psoriasis        Relevant Orders    HLA B27 Antigen    CBC W/ AUTO DIFFERENTIAL    Comprehensive Metabolic Panel    Sedimentation rate    C-REACTIVE PROTEIN    XR Arthritis Survey    X-Ray Elbow 2 Views Left    Osteopenia after menopause        Fracture Risk Assessment Score (FRAX) indicating greater than 20% risk for major osteoporosis-related fracture              Follow up in 6 months    60 minutes of total time spent on the encounter, which includes face to face time and non-face to face time preparing to see the patient (eg, review of tests), Obtaining and/or reviewing separately obtained history, Documenting clinical information in the electronic or other health record, Independently interpreting results (not separately reported) and communicating results to the  patient/family/caregiver, or Care coordination (not separately reported).     This note was prepared with [employer] voice recognition transcription software. Garbled syntax, mangled pronouns, and other bizarre constructions may be attributed to that software system       Jessica Fernandez M.D.  Rheumatology Dept  [place], [place]

## 2024-02-06 ENCOUNTER — OFFICE VISIT (OUTPATIENT)
Dept: RHEUMATOLOGY | Facility: CLINIC | Age: 69
End: 2024-02-06
Payer: MEDICARE

## 2024-02-06 VITALS
WEIGHT: 138.88 LBS | HEART RATE: 78 BPM | BODY MASS INDEX: 24.61 KG/M2 | DIASTOLIC BLOOD PRESSURE: 70 MMHG | SYSTOLIC BLOOD PRESSURE: 114 MMHG | HEIGHT: 63 IN

## 2024-02-06 DIAGNOSIS — M85.80 OSTEOPENIA AFTER MENOPAUSE: ICD-10-CM

## 2024-02-06 DIAGNOSIS — M72.2 PLANTAR FASCIITIS: ICD-10-CM

## 2024-02-06 DIAGNOSIS — Z78.0 OSTEOPENIA AFTER MENOPAUSE: ICD-10-CM

## 2024-02-06 DIAGNOSIS — M13.80 MIGRATORY POLYARTHRITIS: Primary | ICD-10-CM

## 2024-02-06 DIAGNOSIS — Z91.89 FRACTURE RISK ASSESSMENT SCORE (FRAX) INDICATING GREATER THAN 20% RISK FOR MAJOR OSTEOPOROSIS-RELATED FRACTURE: ICD-10-CM

## 2024-02-06 DIAGNOSIS — L40.9 PSORIASIS: ICD-10-CM

## 2024-02-06 PROCEDURE — 99215 OFFICE O/P EST HI 40 MIN: CPT | Mod: PBBFAC,PN | Performed by: STUDENT IN AN ORGANIZED HEALTH CARE EDUCATION/TRAINING PROGRAM

## 2024-02-06 PROCEDURE — 99215 OFFICE O/P EST HI 40 MIN: CPT | Mod: S$PBB,,, | Performed by: STUDENT IN AN ORGANIZED HEALTH CARE EDUCATION/TRAINING PROGRAM

## 2024-02-06 PROCEDURE — 99999 PR PBB SHADOW E&M-EST. PATIENT-LVL V: CPT | Mod: PBBFAC,,, | Performed by: STUDENT IN AN ORGANIZED HEALTH CARE EDUCATION/TRAINING PROGRAM

## 2024-02-14 ENCOUNTER — HOSPITAL ENCOUNTER (OUTPATIENT)
Dept: RADIOLOGY | Facility: HOSPITAL | Age: 69
Discharge: HOME | End: 2024-02-14
Attending: STUDENT IN AN ORGANIZED HEALTH CARE EDUCATION/TRAINING PROGRAM
Payer: MEDICARE

## 2024-02-14 DIAGNOSIS — M72.2 PLANTAR FASCIITIS: ICD-10-CM

## 2024-02-14 DIAGNOSIS — L40.9 PSORIASIS: ICD-10-CM

## 2024-02-14 DIAGNOSIS — M13.80 MIGRATORY POLYARTHRITIS: ICD-10-CM

## 2024-02-14 PROCEDURE — 73070 X-RAY EXAM OF ELBOW: CPT | Mod: 26,LT,, | Performed by: RADIOLOGY

## 2024-02-14 PROCEDURE — 73070 X-RAY EXAM OF ELBOW: CPT | Mod: TC,FY,PO,LT

## 2024-02-14 PROCEDURE — 77077 JOINT SURVEY SINGLE VIEW: CPT | Mod: 26,,, | Performed by: RADIOLOGY

## 2024-02-14 PROCEDURE — 77077 JOINT SURVEY SINGLE VIEW: CPT | Mod: TC,PO

## 2024-02-20 ENCOUNTER — PATIENT MESSAGE (OUTPATIENT)
Dept: RHEUMATOLOGY | Facility: CLINIC | Age: 69
End: 2024-02-20
Payer: MEDICARE

## 2024-02-21 NOTE — PROGRESS NOTES
Subjective:      Patient ID: Mery Krueger is a 69 y.o. female.    Chief Complaint: Disease Management    HPI    Rheumatologic History:      - Diagnosis/es:              - Psoriasis diagnosed in  by Dr Fernandez   - Psoriatic arthritis: diagnosed 2024 on the basis of personal history of psoriasis, negative RF, and x-ray changes suggestive of psoriatic arthritis)   - Erosive osteoarthritis              - Osteopenia with elevated FRAX not on therapy per patient preference  - Family History: No autoimmune conditions  - Obstetric History:  (no complications and born full term)   - Positive serologies:  COLLIN 1:40 dense fine speckled, smooth muscle (1:40)  - Negative serologies:  RF, CCP, SSA, SSB, dsDNA  - Infectious screening labs: Negative T spot (2023)   - Imaging:              - DEXA (10/2023) osteopenia with 34% risk of a major osteoporotic fracture and a 4.5% risk of hip fracture in the next 10 years (FRAX).               - Xray left ankle (2015) calcaneal enthesophyte              - Xray right foot (10/2021) There is cortical irregularity noted at the right 2nd DIP joint, possibly the result of previous trauma or infection.  Please correlate clinically.  There is soft tissue swelling about the right 2nd toe.      - Xray left elbow (24) Interval development of a large olecranon spur is noted as can be seen with calcific tendinopathy or tug type injury at the triceps insertion upon the olecranon.  Soft tissue swelling is noted anterior to the spur that could relate to acute bursal irritation or tendinopathy.  Epicondylar spurring is noted along the lateral humeral epicondyle as can be seen with history of epicondylitis.  No obvious anterior elbow joint effusion is noted.  A small sclerotic focus is noted along radius head suggestive a bone island or bone infarct of 5 mm new since prior.    - Xray arthritis survey (24) Multifocal degenerative changes noted as described above. Distal  "interphalangeal changes are noted as can be seen with erosive osteoarthropathy or inflammatory arthropathies such as psoriasis. Please clinically correlate.   - Previous Treatments:              - Topical meds  - Current Treatments:   - Tremfya (took only 1 dose 2022, insurance would not cover it; in 8/2023 she took 2 shots which cleared it up again)     Interval History:   Currently, she denies joint pain, swelling, and rashes.     Initial History:   This is a 69-year-old woman with history of HLD, CAD s/p stent placement x 3 (2018), GERD, allergic rhinitis, right ankle fracture s/p repair (1998) osteopenia with elevated FRAX not on medication, right CTS, and psoriasis on Tremfya who was last seen by Dr Viramontes in 2/2021 and subsequently lost to follow up. She reports migratory arthralgia that began many years ago before the onset of her psoriasis. She reports pain in her left knee and left elbow and occasionally both hips, as well as hair thinning. Initially, she took 1 dose of sample Tremfya in 2022 which cleared up her rashes and helped with her joint pain.  Insurance would not cover Tremfya as maintenance medication, so subsequently she went off DMARD therapy until August 20, 2023 when she took another 2 sample shots which did clear up her rashes, but did not help with her arthralgia.  I do not find active inflammatory arthritis, nail dystrophy, or psoriasis on exam today. She does not meet CASPAR criteria at this time, but I discussed that some of her symptoms are suspicious for psoriatic arthritis.  Dermatology may consider starting her on a maintenance medication that is FDA approved for both psoriasis and psoriatic arthritis such as Otezla for example for now.    Objective:   /68   Pulse 69   Ht 5' 3" (1.6 m)   Wt 62 kg (136 lb 11 oz)   BMI 24.21 kg/m²   Physical Exam   Constitutional: normal appearance.   HENT:   Head: Normocephalic and atraumatic.   Cardiovascular: Normal rate, regular rhythm and " normal heart sounds.   Pulmonary/Chest: Effort normal and breath sounds normal.   Musculoskeletal:      Comments: No synovitis, dactylitis, enthesitis, effusions     Neurological: She is alert.   Skin: Skin is warm and dry. No rash noted.   No skin thickening, telangiectasias, calcinosis, psoriasiform lesions, lupoid lesions       No data to display     Assessment:     1. Psoriasis    2. Psoriatic arthritis    3. Osteopenia after menopause    4. Fracture Risk Assessment Score (FRAX) indicating greater than 20% risk for major osteoporosis-related fracture      This is a 69-year-old woman with history of HLD, CAD s/p stent placement x 3 (2018), GERD, allergic rhinitis, right ankle fracture s/p repair (1998) osteopenia with elevated FRAX not on medication per patient preference, right CTS, and psoriasis on Tremfya.  She now meets criteria for psoriatic arthritis given x-ray findings suggestive of PSA. I will request PA for Otezla out of concern for Xray changes despite lack of joint pain at the moment.    Plan:     Problem List Items Addressed This Visit          Orthopedic    Psoriatic arthritis    Relevant Medications    apremilast (OTEZLA STARTER) 10 mg (4)-20 mg (4)-30 mg (47) DsPk    apremilast (OTEZLA) 30 mg Tab    Other Relevant Orders    CBC Auto Differential    Comprehensive Metabolic Panel    Sedimentation rate    C-Reactive Protein    HLA B27 Antigen     Other Visit Diagnoses       Psoriasis    -  Primary    Relevant Medications    apremilast (OTEZLA STARTER) 10 mg (4)-20 mg (4)-30 mg (47) DsPk    apremilast (OTEZLA) 30 mg Tab    Other Relevant Orders    CBC Auto Differential    Comprehensive Metabolic Panel    Sedimentation rate    C-Reactive Protein    HLA B27 Antigen    Osteopenia after menopause        Relevant Orders    CBC Auto Differential    Comprehensive Metabolic Panel    Sedimentation rate    C-Reactive Protein    HLA B27 Antigen    Fracture Risk Assessment Score (FRAX) indicating greater than 20%  risk for major osteoporosis-related fracture        Relevant Orders    CBC Auto Differential    Comprehensive Metabolic Panel    Sedimentation rate    C-Reactive Protein    HLA B27 Antigen          - Request PA for Otezla. I discussed potential side effects including gastrointestinal issues, infection, suicidal ideations, and weight loss. Patient information on Otezla was provided.   - Labs before follow up    Follow up in 3 months    30 minutes of total time spent on the encounter, which includes face to face time and non-face to face time preparing to see the patient (eg, review of tests), Obtaining and/or reviewing separately obtained history, Documenting clinical information in the electronic or other health record, Independently interpreting results (not separately reported) and communicating results to the patient/family/caregiver, or Care coordination (not separately reported).     This note was prepared with [employer] Direct voice recognition transcription software. Garbled syntax, mangled pronouns, and other bizarre constructions may be attributed to that software system       Jessica Fernandez M.D.  Rheumatology Dept  [place], [place]

## 2024-02-22 ENCOUNTER — OFFICE VISIT (OUTPATIENT)
Dept: RHEUMATOLOGY | Facility: CLINIC | Age: 69
End: 2024-02-22
Payer: MEDICARE

## 2024-02-22 VITALS
DIASTOLIC BLOOD PRESSURE: 68 MMHG | SYSTOLIC BLOOD PRESSURE: 106 MMHG | HEIGHT: 63 IN | HEART RATE: 69 BPM | BODY MASS INDEX: 24.22 KG/M2 | WEIGHT: 136.69 LBS

## 2024-02-22 DIAGNOSIS — Z91.89 FRACTURE RISK ASSESSMENT SCORE (FRAX) INDICATING GREATER THAN 20% RISK FOR MAJOR OSTEOPOROSIS-RELATED FRACTURE: ICD-10-CM

## 2024-02-22 DIAGNOSIS — L40.9 PSORIASIS: Primary | ICD-10-CM

## 2024-02-22 DIAGNOSIS — M85.80 OSTEOPENIA AFTER MENOPAUSE: ICD-10-CM

## 2024-02-22 DIAGNOSIS — Z78.0 OSTEOPENIA AFTER MENOPAUSE: ICD-10-CM

## 2024-02-22 DIAGNOSIS — L40.50 PSORIATIC ARTHRITIS: ICD-10-CM

## 2024-02-22 PROCEDURE — 99214 OFFICE O/P EST MOD 30 MIN: CPT | Mod: S$PBB,,, | Performed by: STUDENT IN AN ORGANIZED HEALTH CARE EDUCATION/TRAINING PROGRAM

## 2024-02-22 PROCEDURE — 99213 OFFICE O/P EST LOW 20 MIN: CPT | Mod: PBBFAC,PN | Performed by: STUDENT IN AN ORGANIZED HEALTH CARE EDUCATION/TRAINING PROGRAM

## 2024-02-22 PROCEDURE — 99999 PR PBB SHADOW E&M-EST. PATIENT-LVL III: CPT | Mod: PBBFAC,,, | Performed by: STUDENT IN AN ORGANIZED HEALTH CARE EDUCATION/TRAINING PROGRAM

## 2024-03-21 ENCOUNTER — OFFICE VISIT (OUTPATIENT)
Dept: PRIMARY CARE CLINIC | Facility: CLINIC | Age: 69
End: 2024-03-21
Payer: MEDICARE

## 2024-03-21 VITALS
SYSTOLIC BLOOD PRESSURE: 126 MMHG | BODY MASS INDEX: 24.35 KG/M2 | DIASTOLIC BLOOD PRESSURE: 70 MMHG | WEIGHT: 137.44 LBS | RESPIRATION RATE: 20 BRPM | OXYGEN SATURATION: 96 % | HEART RATE: 72 BPM

## 2024-03-21 DIAGNOSIS — R74.8 ELEVATED LIVER ENZYMES: ICD-10-CM

## 2024-03-21 DIAGNOSIS — E78.00 PURE HYPERCHOLESTEROLEMIA: ICD-10-CM

## 2024-03-21 DIAGNOSIS — J30.9 ALLERGIC RHINITIS, UNSPECIFIED SEASONALITY, UNSPECIFIED TRIGGER: ICD-10-CM

## 2024-03-21 DIAGNOSIS — J45.20 MILD INTERMITTENT ASTHMA WITHOUT COMPLICATION: ICD-10-CM

## 2024-03-21 DIAGNOSIS — M54.50 ACUTE LEFT-SIDED LOW BACK PAIN WITHOUT SCIATICA: ICD-10-CM

## 2024-03-21 DIAGNOSIS — I25.10 CORONARY ARTERY DISEASE DUE TO CALCIFIED CORONARY LESION: Primary | ICD-10-CM

## 2024-03-21 DIAGNOSIS — K21.9 GASTROESOPHAGEAL REFLUX DISEASE, UNSPECIFIED WHETHER ESOPHAGITIS PRESENT: ICD-10-CM

## 2024-03-21 DIAGNOSIS — I25.84 CORONARY ARTERY DISEASE DUE TO CALCIFIED CORONARY LESION: Primary | ICD-10-CM

## 2024-03-21 PROCEDURE — 99214 OFFICE O/P EST MOD 30 MIN: CPT | Mod: S$PBB,,, | Performed by: INTERNAL MEDICINE

## 2024-03-21 PROCEDURE — 99999 PR PBB SHADOW E&M-EST. PATIENT-LVL III: CPT | Mod: PBBFAC,,, | Performed by: INTERNAL MEDICINE

## 2024-03-21 PROCEDURE — 99213 OFFICE O/P EST LOW 20 MIN: CPT | Mod: PBBFAC,PN | Performed by: INTERNAL MEDICINE

## 2024-03-21 RX ORDER — ESCITALOPRAM OXALATE 20 MG/1
20 TABLET ORAL DAILY
Qty: 90 TABLET | Refills: 1 | Status: SHIPPED | OUTPATIENT
Start: 2024-03-21 | End: 2025-03-21

## 2024-03-21 NOTE — PROGRESS NOTES
INTERNAL MEDICINE PROGRESS/URGENT CARE NOTE    CHIEF COMPLAINT     Chief Complaint   Patient presents with    Follow-up     6 mo f/u        HPI     Mery Krueger is a very jovi 69 y.o.  female who presents with a PMHx of  asthma, history of MI, GERD, HLD, hypothyroid, arthritis, CAD, who presents today for routine 6-month follow up visit.         Within the past 2 years or so, her    after suffering an MI in his sleep. She has been spending a great deal of time with her daughter who just had a knee surgery. She has been very busy with that.   She has done luciano well with coping  From a health standpoint, she has been doig well otherwise, with not much notable medical complaints.   No chest pain. Adequate follow up for the CAD with stents.      Her main issues and complaits today are:   Chronic isomnia. She worries and thinks a lot. We discussed increasing the dose of lexapro today     At her previous visit we discussed   Hair loss: recently saw a dermatologist who started her on nutrofol. As well as recommended biotin.   Excessive sweating. Says shes normally a sweater but it seems to have worsened. No other hormonal symptoms. No symptoms consistent with thyroid disorder and it had been checked less than 6 months ago and was wnl.   Dermatologist diagnosed her with psociatic arthritis and started her on a an immunologic medication which shes says helped with her psoriasis but did not help wi th her  joint pains, thinks the recent shot may have even worsened this.      CAD/History of NSTEMI with stents: on zetia. On plavix and crestor 20mg daily.   Asymptomatic  Regarding diet and exercise, she reports she does exercise consistently. Not less active   Follows closely with a cardiologist.   LDL is not at goal. Today recommend increasing dose of crestor      Elevated liver enzymes: likely du eto statin use. US liver was wnl. She says cardiology had stopped her zetia and it does appear like LFTs had  improved. She thinks its due to the TREMFYA.       Home Medications:  Prior to Admission medications    Medication Sig Start Date End Date Taking? Authorizing Provider   albuterol (VENTOLIN HFA) 90 mcg/actuation inhaler Inhale 2 puffs into the lungs every 6 (six) hours as needed for Wheezing. Rescue 5/17/22  Yes Sabi Powell NP   ascorbic acid, vitamin C, (VITAMIN C) 1000 MG tablet Take 1,000 mg by mouth once daily.   Yes Provider, Historical   azelastine (ASTELIN) 137 mcg (0.1 %) nasal spray 1 SPRAY (137 MCG TOTAL) BY NASAL ROUTE 2 (TWO) TIMES DAILY AS NEEDED (ALLERGY). 8/1/22  Yes Yesi Salgado NP   cholecalciferol, vitamin D3, (VITAMIN D3) 125 mcg (5,000 unit) Tab Take 5,000 Units by mouth once daily.   Yes Provider, Historical   clopidogreL (PLAVIX) 75 mg tablet TAKE 1 TABLET BY MOUTH EVERY DAY 6/29/22  Yes Charo Wright NP   EScitalopram oxalate (LEXAPRO) 10 MG tablet TAKE 1 TABLET BY MOUTH EVERY DAY IN THE EVENING 12/21/23  Yes Destiny Villalta MD   ezetimibe (ZETIA) 10 mg tablet TAKE 1 TABLET BY MOUTH EVERY DAY 12/6/22  Yes Charo Wright NP   fluticasone propionate (FLONASE) 50 mcg/actuation nasal spray Spray 2 sprays as needed by nasal route for 90 days.   Yes Provider, Historical   guselkumab (TREMFYA) 100 mg/mL AtIn Inject one (100 mg) at week 0, one (100 mg) at week 4, then one (100 mg) every 8 weeks thereafter. 8/8/23  Yes Shannon Newby MD   levothyroxine (SYNTHROID) 25 MCG tablet TAKE 1 TABLET BY MOUTH EVERY DAY BEFORE BREAKFAST 11/29/23  Yes Destiny Villalta MD   nebulizer and compressor (COMP-AIR NEBULIZER COMPRESSOR) Hiral Use as directed 5/17/22  Yes Sabi Powell NP   pantoprazole (PROTONIX) 40 MG tablet Take 40 mg by mouth once daily.   Yes Provider, Historical   rosuvastatin (CRESTOR) 20 MG tablet TAKE 1 TABLET BY MOUTH EVERY DAY IN THE EVENING 4/28/22  Yes Charo Wright NP       Review of Systems:  Review of Systems      PHYSICAL EXAM     /70 (BP  Location: Left arm, Patient Position: Sitting, BP Method: Medium (Manual))   Pulse 72   Resp 20   Wt 62.4 kg (137 lb 7.3 oz)   SpO2 96%   BMI 24.35 kg/m²     GEN - A+OX4, NAD   HEENT - PERRL, EOMI, OP clear  Neck - No thyromegaly or cervical LAD. No thyroid masses felt.  CV - RRR, no m/r   Chest - CTAB, no wheezing or rhonchi  Abd - S/NT/ND/+BS.   Ext - 2+BDP and radial pulses. No C/C/E.  Skin - No rash.    LABS         ASSESSMENT/PLAN     Mery Krueger is a 69 y.o. female with  1. Coronary artery disease, Stents LAD 12/2018 [place] h/o acute stent thrombosis.  LDL needs to be less than 55  Overview:  LAD stent. 3 stents  Follows with cardiology. Scheduled for her cardiac testing.   On statin and plavix   Advised doing more cardio      2. Pure hypercholesterolemia  Overview:  LDL not at goal but was when on the zetia. It is to be restarted by cardiology. Was stopped to see if that was affecting her LFTs.   Continue with statin. And zetia  Continue with cardiology.   Limit cholesterol and sweets in diet. Advised healthy eating out      3. Allergic rhinitis, unspecified seasonality, unspecified trigger  Overview:  Very well controlled  On azelastin  Avoid allergens      4. Mild intermittent asthma without complication  Overview:  Controlled . Prn albuterol  Avoid triggers and allergens      5. Gastroesophageal reflux disease, unspecified whether esophagitis present  Overview:  S/p recent EGD with gastritic, duodenal ulcers.  Pepcid works very well for her so I advised her to maybe stick with that instead of protonix    On protonix and better  Discussed lifestyle changes and foods to limit       6. Acute left-sided low back pain without sciatica  STABLE     7. Elevated liver enzymes  Improved since stopped zzetia but in all fairness she's been off tremfya which she thinks is the cause  Continue to monitor  Liver US wnl            WORRY SCORE 1    RTC in 6 months, sooner if needed and depending on  labs.    Destiny Villalta MD  Board Certified Internist/Geriatrician  [facility] ([place])

## 2024-03-27 ENCOUNTER — TELEPHONE (OUTPATIENT)
Dept: RHEUMATOLOGY | Facility: CLINIC | Age: 69
End: 2024-03-27
Payer: MEDICARE

## 2024-03-27 NOTE — TELEPHONE ENCOUNTER
----- Message from Amanda Woodall LPN sent at 3/14/2024  2:23 PM CDT -----  Regarding: FW: Errol    ----- Message -----  From: Gretchen Armas PharmD  Sent: 3/14/2024   2:06 PM CDT  To: [facility] Staff  Subject: Errol Fuller,     The PA for Errol has been approved with a high copay. Unfortunately, she is not eligible for financial assistance through the [employer] patient assistance program due to being over the income limit. Please advise.     Thank you,   Gretchen Armas PharmD   Clinical Pharmacist- [facility]   P: [phone]   F:[phone]

## 2024-03-27 NOTE — TELEPHONE ENCOUNTER
Patient has PsA and PsO. Tried Tremfya but cost issues. Likely will run into cost issues with injectables through the pharmacy benefits. Could try an infusion through the medical benefit    Norman and Luis are through [employer] and have the highest income limit.     Potential infusion options: Maricarmen Wolf      Routing provider to advise

## 2024-03-28 NOTE — TELEPHONE ENCOUNTER
Discussed with provider. Would like patient to maximize use of NSAIDs and monitor closely since patient is not having joint pain currently    Called patient and notified her of above. Patient is ok with the above and agreed to reach out to the office if she starts having joint pain    Routing [facility] as FYSEEMA

## 2024-04-17 ENCOUNTER — PATIENT MESSAGE (OUTPATIENT)
Dept: PRIMARY CARE CLINIC | Facility: CLINIC | Age: 69
End: 2024-04-17
Payer: MEDICARE

## 2024-04-23 ENCOUNTER — PATIENT MESSAGE (OUTPATIENT)
Dept: PRIMARY CARE CLINIC | Facility: CLINIC | Age: 69
End: 2024-04-23
Payer: MEDICARE

## 2024-04-23 ENCOUNTER — PATIENT MESSAGE (OUTPATIENT)
Dept: ADMINISTRATIVE | Facility: OTHER | Age: 69
End: 2024-04-23
Payer: MEDICARE

## 2024-04-24 ENCOUNTER — PATIENT MESSAGE (OUTPATIENT)
Dept: PRIMARY CARE CLINIC | Facility: CLINIC | Age: 69
End: 2024-04-24
Payer: MEDICARE

## 2024-04-24 DIAGNOSIS — E53.8 VITAMIN B12 DEFICIENCY: Primary | ICD-10-CM

## 2024-04-25 NOTE — TELEPHONE ENCOUNTER
I spoke with Ms. Krueger who will try to get her last lab results from her non-[facility] provider to bring in so that Dr. Villalta may review them.

## 2024-05-07 PROBLEM — E53.8 VITAMIN B12 DEFICIENCY: Status: ACTIVE | Noted: 2024-05-07

## 2024-05-07 RX ORDER — CYANOCOBALAMIN 1000 UG/ML
1000 INJECTION, SOLUTION INTRAMUSCULAR; SUBCUTANEOUS
Status: DISCONTINUED | OUTPATIENT
Start: 2024-05-08 | End: 2024-05-14

## 2024-05-10 NOTE — TELEPHONE ENCOUNTER
P.A. appeal initiated. [facility]#[id number]. [employer] Appeals phone ([phone], fax [phone].

## 2024-05-13 NOTE — TELEPHONE ENCOUNTER
Please inform patient and advise her to take 1000mcg otc. And she may contact her insurance to discuss further. Unsure what it was denies as she is indeed deficient and I've honestly not seen them deny one.

## 2024-05-13 NOTE — TELEPHONE ENCOUNTER
Patient was informed of the denieal and of Dr. Villalta's recs. She states she will call her insurance company about it tomorrow and in the meantime she would get the 1000mcg of otc B12.

## 2024-05-14 DIAGNOSIS — E53.8 VITAMIN B12 DEFICIENCY: Primary | ICD-10-CM

## 2024-05-14 RX ORDER — ISOPROPYL ALCOHOL 70 ML/100ML
SWAB TOPICAL
Qty: 40 EACH | Refills: 0 | Status: SHIPPED | OUTPATIENT
Start: 2024-05-14

## 2024-05-14 RX ORDER — CYANOCOBALAMIN 1000 UG/ML
1000 INJECTION, SOLUTION INTRAMUSCULAR; SUBCUTANEOUS
Qty: 1 ML | Refills: 2 | Status: SHIPPED | OUTPATIENT
Start: 2024-05-14

## 2024-05-14 RX ORDER — SYRINGE AND NEEDLE,INSULIN,1ML 30 G X1/2"
SYRINGE, EMPTY DISPOSABLE MISCELLANEOUS
Qty: 1 EACH | Refills: 0 | Status: SHIPPED | OUTPATIENT
Start: 2024-05-14

## 2024-05-14 NOTE — TELEPHONE ENCOUNTER
Patient states she would like to administer her B12 to herself at home. She asked if it could be ordered along with syringes and needles at [facility].

## 2024-05-20 NOTE — PROGRESS NOTES
Subjective:      Patient ID: Mery Krueger is a 69 y.o. female.    Chief Complaint: Disease Management    HPI    Rheumatologic History:      - Diagnosis/es:              - Psoriasis diagnosed in  by Dr Fernandez              - Psoriatic arthritis: diagnosed 2024 on the basis of personal history of psoriasis, negative RF, and x-ray changes suggestive of psoriatic arthritis)              - Erosive osteoarthritis              - Osteopenia with elevated FRAX not on therapy per patient preference  - Family History: No autoimmune conditions  - Obstetric History:  (no complications and born full term)   - Positive serologies:  COLLIN 1:40 dense fine speckled, smooth muscle (1:40)  - Negative serologies:  RF, CCP, SSA, SSB, dsDNA  - Infectious screening labs: Negative T spot (2023)   - Imaging:              - DEXA (10/2023) osteopenia with 34% risk of a major osteoporotic fracture and a 4.5% risk of hip fracture in the next 10 years (FRAX).               - Xray left ankle (2015) calcaneal enthesophyte              - Xray right foot (10/2021) There is cortical irregularity noted at the right 2nd DIP joint, possibly the result of previous trauma or infection.  Please correlate clinically.  There is soft tissue swelling about the right 2nd toe.                 - Xray left elbow (24) Interval development of a large olecranon spur is noted as can be seen with calcific tendinopathy or tug type injury at the triceps insertion upon the olecranon.  Soft tissue swelling is noted anterior to the spur that could relate to acute bursal irritation or tendinopathy.  Epicondylar spurring is noted along the lateral humeral epicondyle as can be seen with history of epicondylitis.  No obvious anterior elbow joint effusion is noted.  A small sclerotic focus is noted along radius head suggestive a bone island or bone infarct of 5 mm new since prior.               - Xray arthritis survey (24) Multifocal degenerative  changes noted as described above. Distal interphalangeal changes are noted as can be seen with erosive osteoarthropathy or inflammatory arthropathies such as psoriasis. Please clinically correlate.   - Previous Treatments:              - Topical meds   - Otezla: approved but never tried due to cost  - Current Treatments:   - Tremfya (took only 1 dose 2022, insurance would not cover it; in 8/2023 she took 2 shots which cleared it up again)     Interval History:   Otezla was approved but was too expensive. Saw dermatology and plans to take Tremfya once a year when her psoriasis flares which seems to be sufficient for her skin.      Initial History:   This is a 69-year-old woman with history of HLD, CAD s/p stent placement x 3 (2018), GERD, allergic rhinitis, right ankle fracture s/p repair (1998) osteopenia with elevated FRAX not on medication, right CTS, and psoriasis on Tremfya who was last seen by Dr Viramontes in 2/2021 and subsequently lost to follow up. She reports migratory arthralgia that began many years ago before the onset of her psoriasis. She reports pain in her left knee and left elbow and occasionally both hips, as well as hair thinning. Initially, she took 1 dose of sample Tremfya in 2022 which cleared up her rashes and helped with her joint pain.  Insurance would not cover Tremfya as maintenance medication, so subsequently she went off DMARD therapy until August 20, 2023 when she took another 2 sample shots which did clear up her rashes, but did not help with her arthralgia.  I do not find active inflammatory arthritis, nail dystrophy, or psoriasis on exam today. She does not meet CASPAR criteria at this time, but I discussed that some of her symptoms are suspicious for psoriatic arthritis.  Dermatology may consider starting her on a maintenance medication that is FDA approved for both psoriasis and psoriatic arthritis such as Otezla for example for now.       Objective:   /66   Pulse 75   Ht 5'  "3" (1.6 m)   Wt 62.6 kg (138 lb)   BMI 24.45 kg/m²   Physical Exam   Constitutional: normal appearance.   HENT:   Head: Normocephalic and atraumatic.   Pulmonary/Chest: Effort normal.   Musculoskeletal:      Comments: No synovitis, dactylitis, enthesitis, effusions     Neurological: She is alert.   Skin: Skin is warm and dry. No rash noted.   No skin thickening, telangiectasias, calcinosis, psoriasiform lesions, lupoid lesions         No data to display     Assessment:     1. Psoriasis    2. Psoriatic arthritis    3. Osteopenia after menopause        This is a 69-year-old woman with history of HLD, CAD s/p stent placement x 3 (2018), GERD, allergic rhinitis, right ankle fracture s/p repair (1998) osteopenia with elevated FRAX not on medication per patient preference, right CTS, psoriasis, and psoriatic arthritis. Otezla was approved but was too expensive. Saw dermatology and plans to take Tremfya once a year when her psoriasis flares which seems to be sufficient for her skin. I discussed that this type of intermittent dosing has not been studied in PsA and there is risk of joint damage that I would not be able to reverse. However, since she is not having pain or swelling at the moment and wishes to stay off medication, will monitor for progression with yearly Xrays. She knows to reach out to me if her joints flare.     Plan:     Problem List Items Addressed This Visit          Derm    Psoriasis - Primary       Orthopedic    Psoriatic arthritis    Osteopenia after menopause     Follow up in 6 months or sooner if needed    30 minutes of total time spent on the encounter, which includes face to face time and non-face to face time preparing to see the patient (eg, review of tests), Obtaining and/or reviewing separately obtained history, Documenting clinical information in the electronic or other health record, Independently interpreting results (not separately reported) and communicating results to the " patient/family/caregiver, or Care coordination (not separately reported).     This note was prepared with [employer] Direct voice recognition transcription software. Garbled syntax, mangled pronouns, and other bizarre constructions may be attributed to that software system       Jessica Fernandez M.D.  Rheumatology Dept  [place], [place]

## 2024-05-22 ENCOUNTER — OFFICE VISIT (OUTPATIENT)
Dept: RHEUMATOLOGY | Facility: CLINIC | Age: 69
End: 2024-05-22
Payer: MEDICARE

## 2024-05-22 VITALS
DIASTOLIC BLOOD PRESSURE: 66 MMHG | BODY MASS INDEX: 24.45 KG/M2 | HEART RATE: 75 BPM | WEIGHT: 138 LBS | SYSTOLIC BLOOD PRESSURE: 102 MMHG | HEIGHT: 63 IN

## 2024-05-22 DIAGNOSIS — L40.9 PSORIASIS: Primary | ICD-10-CM

## 2024-05-22 DIAGNOSIS — L40.50 PSORIATIC ARTHRITIS: ICD-10-CM

## 2024-05-22 DIAGNOSIS — M85.80 OSTEOPENIA AFTER MENOPAUSE: ICD-10-CM

## 2024-05-22 DIAGNOSIS — Z78.0 OSTEOPENIA AFTER MENOPAUSE: ICD-10-CM

## 2024-05-22 PROCEDURE — 99999 PR PBB SHADOW E&M-EST. PATIENT-LVL III: CPT | Mod: PBBFAC,,, | Performed by: STUDENT IN AN ORGANIZED HEALTH CARE EDUCATION/TRAINING PROGRAM

## 2024-05-22 PROCEDURE — 99213 OFFICE O/P EST LOW 20 MIN: CPT | Mod: PBBFAC,PN | Performed by: STUDENT IN AN ORGANIZED HEALTH CARE EDUCATION/TRAINING PROGRAM

## 2024-05-22 PROCEDURE — 99214 OFFICE O/P EST MOD 30 MIN: CPT | Mod: S$PBB,,, | Performed by: STUDENT IN AN ORGANIZED HEALTH CARE EDUCATION/TRAINING PROGRAM

## 2024-05-22 RX ORDER — FAMOTIDINE 20 MG/1
20 TABLET, FILM COATED ORAL 2 TIMES DAILY
COMMUNITY

## 2024-05-31 DIAGNOSIS — E03.9 HYPOTHYROIDISM, UNSPECIFIED TYPE: Primary | ICD-10-CM

## 2024-05-31 RX ORDER — LEVOTHYROXINE SODIUM 25 UG/1
25 TABLET ORAL
Qty: 90 TABLET | Refills: 1 | Status: SHIPPED | OUTPATIENT
Start: 2024-05-31

## 2024-06-26 ENCOUNTER — TELEPHONE (OUTPATIENT)
Dept: FAMILY MEDICINE | Facility: CLINIC | Age: 69
End: 2024-06-26
Payer: MEDICARE

## 2024-06-26 NOTE — TELEPHONE ENCOUNTER
Spoke with pt, she needs med refilled, confirmed [facility] [place]    Scheduled AWV for October, pt verbalized understanding.

## 2024-06-28 RX ORDER — ESCITALOPRAM OXALATE 10 MG/1
TABLET ORAL
Qty: 90 TABLET | Refills: 0 | Status: SHIPPED | OUTPATIENT
Start: 2024-06-28

## 2024-06-28 NOTE — TELEPHONE ENCOUNTER
Received another refill request.  However we need clarification.  There are two different dosages of escitalopram in her medicine list.

## 2024-07-09 ENCOUNTER — DOCUMENTATION ONLY (OUTPATIENT)
Dept: REHABILITATION | Facility: HOSPITAL | Age: 69
End: 2024-07-09
Payer: MEDICARE

## 2024-07-09 NOTE — PROGRESS NOTES
straight pull downs x  curls x  wall pushups x  Rows x  tricep overheads x  ball slams x  farmer carry x  side and front raises  Band pull aparts x    1 Sit to stands   2 side kicks   3 back kicks   4 toe raises   5 squat walks   6 wall squats   7 high knees   8 good mornings     Does pilates 3x a week wants to get heart rate up  105 target HR

## 2024-08-02 DIAGNOSIS — E53.8 VITAMIN B12 DEFICIENCY: ICD-10-CM

## 2024-08-02 RX ORDER — CYANOCOBALAMIN 1000 UG/ML
1000 INJECTION, SOLUTION INTRAMUSCULAR; SUBCUTANEOUS
Qty: 3 ML | Refills: 3 | Status: SHIPPED | OUTPATIENT
Start: 2024-08-02

## 2024-09-25 DIAGNOSIS — Z00.00 ENCOUNTER FOR MEDICARE ANNUAL WELLNESS EXAM: ICD-10-CM

## 2024-09-25 DIAGNOSIS — F32.A DEPRESSION, UNSPECIFIED DEPRESSION TYPE: Primary | ICD-10-CM

## 2024-09-25 RX ORDER — ESCITALOPRAM OXALATE 10 MG/1
TABLET ORAL
Qty: 90 TABLET | Refills: 3 | Status: SHIPPED | OUTPATIENT
Start: 2024-09-25

## 2024-10-18 ENCOUNTER — OFFICE VISIT (OUTPATIENT)
Dept: PRIMARY CARE CLINIC | Facility: CLINIC | Age: 69
End: 2024-10-18
Payer: MEDICARE

## 2024-10-18 VITALS
TEMPERATURE: 98 F | WEIGHT: 144.63 LBS | HEART RATE: 74 BPM | OXYGEN SATURATION: 97 % | SYSTOLIC BLOOD PRESSURE: 120 MMHG | HEIGHT: 63 IN | DIASTOLIC BLOOD PRESSURE: 64 MMHG | BODY MASS INDEX: 25.62 KG/M2

## 2024-10-18 DIAGNOSIS — M85.80 OSTEOPENIA, UNSPECIFIED LOCATION: ICD-10-CM

## 2024-10-18 DIAGNOSIS — K21.9 GASTROESOPHAGEAL REFLUX DISEASE, UNSPECIFIED WHETHER ESOPHAGITIS PRESENT: ICD-10-CM

## 2024-10-18 DIAGNOSIS — R53.83 FATIGUE, UNSPECIFIED TYPE: ICD-10-CM

## 2024-10-18 DIAGNOSIS — J45.20 MILD INTERMITTENT ASTHMA WITHOUT COMPLICATION: ICD-10-CM

## 2024-10-18 DIAGNOSIS — R07.81 PLEURITIC CHEST PAIN: ICD-10-CM

## 2024-10-18 DIAGNOSIS — I25.10 CORONARY ARTERY DISEASE DUE TO CALCIFIED CORONARY LESION: ICD-10-CM

## 2024-10-18 DIAGNOSIS — I25.84 CORONARY ARTERY DISEASE DUE TO CALCIFIED CORONARY LESION: ICD-10-CM

## 2024-10-18 DIAGNOSIS — E78.00 PURE HYPERCHOLESTEROLEMIA: ICD-10-CM

## 2024-10-18 DIAGNOSIS — E53.8 VITAMIN B12 DEFICIENCY: Primary | ICD-10-CM

## 2024-10-18 PROCEDURE — 99999 PR PBB SHADOW E&M-EST. PATIENT-LVL V: CPT | Mod: PBBFAC,,, | Performed by: INTERNAL MEDICINE

## 2024-10-18 PROCEDURE — 99215 OFFICE O/P EST HI 40 MIN: CPT | Mod: PBBFAC,PN | Performed by: INTERNAL MEDICINE

## 2024-10-18 RX ORDER — ALPRAZOLAM 0.5 MG/1
0.5 TABLET ORAL NIGHTLY PRN
Qty: 5 TABLET | Refills: 0 | Status: SHIPPED | OUTPATIENT
Start: 2024-10-18 | End: 2024-10-23

## 2024-10-18 NOTE — PROGRESS NOTES
INTERNAL MEDICINE PROGRESS/URGENT CARE NOTE    CHIEF COMPLAINT     Chief Complaint   Patient presents with    Follow-up     Patient presents for her 6 month follow up appointment.       ARIAN     Mery Krueger is a very jovi 69 y.o.  female who presents with a PMHx of  asthma, history of MI, GERD, HLD, hypothyroid, arthritis, CAD, who presents today for routine 6-month follow up visit.   Within the past few years or so, her    after suffering an MI in his sleep. She has been spending a great deal of time with her daughter who just had a knee surgery. She has been very busy with that.   She has done luciano well with coping  From a health standpoint, she has been doig well otherwise, with not much notable medical complaints.   No chest pain. Adequate follow up for the CAD with stents.      Her main issues and complaits today are:        At her previous visit we discussed   Hair loss: recently saw a dermatologist who started her on nutrofol. As well as recommended biotin.   Excessive sweating. Says shes normally a sweater but it seems to have worsened. No other hormonal symptoms. No symptoms consistent with thyroid disorder and it had been checked less than 6 months ago and was wnl.   Dermatologist diagnosed her with psociatic arthritis and started her on a an immunologic medication which shes says helped with her psoriasis but did not help wi th her  joint pains, thinks the recent shot may have even worsened this.     Chronic isomnia. She worries and thinks a lot. We discussed increasing the dose of lexapro today      CAD/History of NSTEMI with stents: on zetia. On plavix and crestor 20mg daily.   Asymptomatic  Regarding diet and exercise, she reports she does exercise consistently. Not less active   Follows closely with a cardiologist.   LDL is not at goal. Today recommend increasing dose of crestor        Elevated liver enzymes: likely du eto statin use. US liver was wnl. She says cardiology had  "stopped her zetia and it does appear like LFTs had improved. She thinks its due to the TREMFYA.      Home Medications:  Prior to Admission medications    Medication Sig Start Date End Date Taking? Authorizing Provider   albuterol (VENTOLIN HFA) 90 mcg/actuation inhaler Inhale 2 puffs into the lungs every 6 (six) hours as needed for Wheezing. Rescue 5/17/22   Sabi Powell NP   alcohol swabs PadM Clean site using a circular motion working outward. 5/14/24   Destiny Villalta MD   ascorbic acid, vitamin C, (VITAMIN C) 1000 MG tablet Take 1,000 mg by mouth once daily.    Provider, Historical   azelastine (ASTELIN) 137 mcg (0.1 %) nasal spray 1 SPRAY (137 MCG TOTAL) BY NASAL ROUTE 2 (TWO) TIMES DAILY AS NEEDED (ALLERGY). 8/1/22   Yesi Salgado NP   cholecalciferol, vitamin D3, (VITAMIN D3) 125 mcg (5,000 unit) Tab Take 5,000 Units by mouth once daily.    Provider, Historical   clopidogreL (PLAVIX) 75 mg tablet TAKE 1 TABLET BY MOUTH EVERY DAY 6/29/22   Charo Wright NP   cyanocobalamin 1,000 mcg/mL injection INJECT 1 ML INTO THE SKIN EVERY 30 DAYS 8/2/24   Destiny Villalta MD   doxycycline (MONODOX) 100 MG capsule Take 1 capsule (100 mg total) by mouth once daily. Take with food and water. Stop if you are to get pregnant. 8/26/24   Bibi Adame, DIANAP-C   EScitalopram oxalate (LEXAPRO) 10 MG tablet TAKE 1 TABLET BY MOUTH EVERY DAY IN THE EVENING 9/25/24   Destiny Villalta MD   ezetimibe (ZETIA) 10 mg tablet TAKE 1 TABLET BY MOUTH EVERY DAY 12/6/22   Charo Wright NP   famotidine (PEPCID) 20 MG tablet Take 20 mg by mouth 2 (two) times daily.    Provider, Historical   fluticasone propionate (FLONASE) 50 mcg/actuation nasal spray Spray 2 sprays as needed by nasal route for 90 days.    Provider, Historical   insulin syringe-needle U-100 (INSULIN SYRINGE MICROFINE) 1 mL 27 gauge x 5/8" Syrg Inject 1mL subcutaneously every 30 days for a total of three doses. 5/14/24   Destiny Villalta MD "   levothyroxine (SYNTHROID) 25 MCG tablet TAKE 1 TABLET BY MOUTH EVERY DAY BEFORE BREAKFAST 5/31/24   Destiny Villalta MD   nebulizer and compressor (COMP-AIR NEBULIZER COMPRESSOR) Hiral Use as directed 5/17/22   Sabi Powell NP   pantoprazole (PROTONIX) 40 MG tablet Take 40 mg by mouth once daily.    Provider, Historical   pimecrolimus (ELIDEL) 1 % cream Apply topically 2 (two) times daily. 8/1/24   Bibi Adame FNP-C   rosuvastatin (CRESTOR) 20 MG tablet TAKE 1 TABLET BY MOUTH EVERY DAY IN THE EVENING 4/28/22   Charo Wright NP   tildrakizumab-asmn (ILUMYA) 100 mg/mL subcutaneous injection Inject 1 sq at week 0, then week 4, and then every 12 weeks thereafter 6/12/24   Shannon Newby MD       Review of Systems:  Review of Systems        Advance Care Planning     Date: 10/18/2024    Power of   I initiated the process of voluntary advance care planning today and explained the importance of this process to the patient.  I introduced the concept of advance directives to the patient, as well. Then the patient received detailed information about the importance of designating a Health Care Power of  (HCPOA). She was also instructed to communicate with this person about their wishes for future healthcare, should she become sick and lose decision-making capacity. The patient has previously appointed a HCPOA. After our discussion, the patient has decided to complete a HCPOA and has appointed her daughter, health care agent:  on file  & health care agent number:  on file . I encouraged her to communicate with this person about their wishes for future healthcare, should she become sick and lose decision-making capacity.      A total of 10 min was spent on advance care planning, goals of care discussion, emotional support, formulating and communicating prognosis and exploring burden/benefit of various approaches of treatment. This discussion occurred on a fully voluntary basis with the verbal  "consent of the patient and/or family.             PHYSICAL EXAM     /64 (BP Location: Left arm, Patient Position: Sitting)   Pulse 74   Temp 98 °F (36.7 °C)   Ht 5' 3" (1.6 m)   Wt 65.6 kg (144 lb 10 oz)   SpO2 97%   BMI 25.62 kg/m²     GEN - A+OX4, NAD   HEENT - PERRL, EOMI, OP clear  Neck - No thyromegaly or cervical LAD. No thyroid masses felt.  CV - RRR, no m/r   Chest - CTAB, no wheezing or rhonchi  Abd - S/NT/ND/+BS.   Ext - 2+BDP and radial pulses. No C/C/E.  Skin - No rash.    LABS       ASSESSMENT/PLAN     Mery Krueger is a 69 y.o. female with  1. Vitamin B12 deficiency  Continue with supplement  -     CBC Auto Differential; Future; Expected date: 10/18/2024    2. Coronary artery disease, Stents LAD 12/2018 [place] h/o acute stent thrombosis.  Not doing enough cardio. We have discussed this   Overview:  LAD stent. 3 stents  Follows with cardiology. Scheduled for her cardiac testing.   On statin and plavix   Advised doing more cardio      3. Pure hypercholesterolemia  Overview:  Pending lipid panel  Continue with statin. And zetia  Continue with cardiology.   Limit cholesterol and sweets in diet. Advised healthy eating out    Orders:  -     Lipid Panel; Future; Expected date: 10/18/2024    4. Mild intermittent asthma without complication  Overview:  Controlled . Prn albuterol  Avoid triggers and allergens      5. Gastroesophageal reflux disease, unspecified whether esophagitis present  Overview:  S/p recent EGD with gastritic, duodenal ulcers.   On protonix and better  Discussed lifestyle changes and foods to limit       6. Osteopenia, unspecified location  On calcium and vitamin d    7. Fatigue, unspecified type  -     Comprehensive Metabolic Panel; Future; Expected date: 10/18/2024  -     TSH; Future; Expected date: 10/18/2024    8. Pleuritic chest pain  Suspect thoracic muscle spasm but will check cxe  -     X-Ray Chest PA And Lateral; Future; Expected date: 10/18/2024           WORRY " SCORE1    RTC in 6 months, sooner if needed and depending on labs.    Destiny Villalta MD  Board Certified Internist/Geriatrician  [facility] ([place])

## 2024-11-26 DIAGNOSIS — E03.9 HYPOTHYROIDISM, UNSPECIFIED TYPE: ICD-10-CM

## 2024-11-26 RX ORDER — LEVOTHYROXINE SODIUM 25 UG/1
25 TABLET ORAL
Qty: 90 TABLET | Refills: 3 | Status: SHIPPED | OUTPATIENT
Start: 2024-11-26

## 2025-01-08 ENCOUNTER — TELEPHONE (OUTPATIENT)
Dept: PRIMARY CARE CLINIC | Facility: CLINIC | Age: 70
End: 2025-01-08

## 2025-01-08 ENCOUNTER — OFFICE VISIT (OUTPATIENT)
Dept: PRIMARY CARE CLINIC | Facility: CLINIC | Age: 70
End: 2025-01-08
Payer: MEDICARE

## 2025-01-08 VITALS
WEIGHT: 143.31 LBS | HEIGHT: 63 IN | BODY MASS INDEX: 25.39 KG/M2 | TEMPERATURE: 98 F | OXYGEN SATURATION: 98 % | SYSTOLIC BLOOD PRESSURE: 115 MMHG | DIASTOLIC BLOOD PRESSURE: 62 MMHG | HEART RATE: 75 BPM

## 2025-01-08 DIAGNOSIS — R53.83 FATIGUE, UNSPECIFIED TYPE: ICD-10-CM

## 2025-01-08 DIAGNOSIS — M79.601 ARM PAIN, RIGHT: Primary | ICD-10-CM

## 2025-01-08 DIAGNOSIS — M79.601 RIGHT ARM PAIN: Primary | ICD-10-CM

## 2025-01-08 PROCEDURE — 99214 OFFICE O/P EST MOD 30 MIN: CPT | Mod: PBBFAC,PN | Performed by: INTERNAL MEDICINE

## 2025-01-08 PROCEDURE — 99999 PR PBB SHADOW E&M-EST. PATIENT-LVL IV: CPT | Mod: PBBFAC,,, | Performed by: INTERNAL MEDICINE

## 2025-01-08 NOTE — TELEPHONE ENCOUNTER
Patient fell 3 weeks ago and had a hematoma from her elbow to her shoulder on that arm. Her arm is now painful to touch. She denies any swelling at this time. The affected area is from her wrist to elbow of her right arm.

## 2025-01-08 NOTE — TELEPHONE ENCOUNTER
Calling to triage patient's arm pain. Left message for pt requesting a call back at earliest convenience.

## 2025-01-08 NOTE — PROGRESS NOTES
INTERNAL MEDICINE PROGRESS/URGENT CARE NOTE    CHIEF COMPLAINT     Chief Complaint   Patient presents with    Arm Pain     Patient presents for eval of right arm pain x3 weeks S/P fall. Patient had some bruising to the back of her right arm, but that is resolving. Patient is now having a nerve-like sensation to her right forearm, where it feels tender if she runs her fingers across the skin.       HPI     Mery Krueger is a 69 y.o.  female who presents for an urgent/follow up visit today.  Patient presents for urgent visit. Says she fell while at [employer]. Did not sustain in head injury but sverely injured her arms posteriorly. Then developed a large bruise and hematoma which has since mostly gone away.   For the past two days. She then noted that the right forearm anterior has painlike sensation when she touches it. No restriction in ROM. Sounds neuropathic. But unclear cause at this time. No suspicion of fracture or DVT as swelling is gone.     Also complains of being very tired and sleepy s lissette her fall.note, she had no head trauma.   Previously developed symptomatic anemia and we agreed to test her blood counts.       Past Medical History:  Past Medical History:   Diagnosis Date    Arthritis     Asthma     Coronary artery disease     Dr. Pereira    GERD (gastroesophageal reflux disease)     Hyperlipidemia     Thyroid disease        Home Medications:  Prior to Admission medications    Medication Sig Start Date End Date Taking? Authorizing Provider   albuterol (VENTOLIN HFA) 90 mcg/actuation inhaler Inhale 2 puffs into the lungs every 6 (six) hours as needed for Wheezing. Rescue 5/17/22  Yes Sabi Powell NP   alcohol swabs PadM Clean site using a circular motion working outward. 5/14/24  Yes Destiny Villalta MD   azelastine (ASTELIN) 137 mcg (0.1 %) nasal spray 1 SPRAY (137 MCG TOTAL) BY NASAL ROUTE 2 (TWO) TIMES DAILY AS NEEDED (ALLERGY). 8/1/22  Yes Yesi Salgado NP   cholecalciferol, vitamin  "D3, (VITAMIN D3) 125 mcg (5,000 unit) Tab Take 5,000 Units by mouth once daily.   Yes Provider, Historical   clopidogreL (PLAVIX) 75 mg tablet TAKE 1 TABLET BY MOUTH EVERY DAY 6/29/22  Yes Charo Wright NP   cyanocobalamin 1,000 mcg/mL injection INJECT 1 ML INTO THE SKIN EVERY 30 DAYS 8/2/24  Yes Destiny Villalta MD   EScitalopram oxalate (LEXAPRO) 10 MG tablet TAKE 1 TABLET BY MOUTH EVERY DAY IN THE EVENING 9/25/24  Yes Destiny Villalta MD   famotidine (PEPCID) 20 MG tablet Take 20 mg by mouth 2 (two) times daily.   Yes Provider, Historical   fluticasone propionate (FLONASE) 50 mcg/actuation nasal spray Spray 2 sprays as needed by nasal route for 90 days.   Yes Provider, Historical   insulin syringe-needle U-100 (INSULIN SYRINGE MICROFINE) 1 mL 27 gauge x 5/8" Syrg Inject 1mL subcutaneously every 30 days for a total of three doses. 5/14/24  Yes Destiny Villalta MD   levothyroxine (SYNTHROID) 25 MCG tablet TAKE 1 TABLET BY MOUTH EVERY DAY BEFORE BREAKFAST 11/26/24  Yes Destiny Villalta MD   liver extract (LIVER ORAL) Take by mouth.   Yes Provider, Historical   nebulizer and compressor (COMP-AIR NEBULIZER COMPRESSOR) Hiral Use as directed 5/17/22  Yes Sabi Powell NP   pantoprazole (PROTONIX) 40 MG tablet Take 40 mg by mouth once daily.   Yes Provider, Historical   rosuvastatin (CRESTOR) 20 MG tablet TAKE 1 TABLET BY MOUTH EVERY DAY IN THE EVENING 4/28/22  Yes Charo Wright NP   tildrakizumab-asmn (ILUMYA) 100 mg/mL subcutaneous injection Inject 1 sq at week 0, then week 4, and then every 12 weeks thereafter  Patient taking differently: Inject into the skin as needed (Psoriatic arthritis). Inject 1 sq at week 0, then week 4, and then every 12 weeks thereafter 6/12/24  Yes Shannon Newby MD   ALPRAZolam (XANAX) 0.5 MG tablet Take 1 tablet (0.5 mg total) by mouth nightly as needed for Anxiety. 10/18/24 1/8/25  Destiny Villalta MD       Review of Systems:  Review of " "Systems        Advance Care Planning     Date: 01/08/2025    Power of   I initiated the process of voluntary advance care planning today and explained the importance of this process to the patient.  I introduced the concept of advance directives to the patient, as well. Then the patient received detailed information about the importance of designating a Health Care Power of  (HCPOA). She was also instructed to communicate with this person about their wishes for future healthcare, should she become sick and lose decision-making capacity. The patient has previously appointed a HCPOA. After our discussion, the patient has decided to complete a HCPOA and has appointed her  family , health care agent:  on file  & health care agent number:  on file . I encouraged her to communicate with this person about their wishes for future healthcare, should she become sick and lose decision-making capacity.      A total of 10 min was spent on advance care planning, goals of care discussion, emotional support, formulating and communicating prognosis and exploring burden/benefit of various approaches of treatment. This discussion occurred on a fully voluntary basis with the verbal consent of the patient and/or family.         PHYSICAL EXAM     /62 (BP Location: Right arm, Patient Position: Sitting)   Pulse 75   Temp 98 °F (36.7 °C)   Ht 5' 3" (1.6 m)   Wt 65 kg (143 lb 4.8 oz)   SpO2 98%   BMI 25.38 kg/m²     GEN - A+OX4, NAD   HEENT - PERRL, EOMI, OP clear  Neck - No thyromegaly or cervical LAD. No thyroid masses felt.  CV - RRR, no m/r   Chest - CTAB, no wheezing or rhonchi  Abd - S/NT/ND/+BS.   Ext - 2+BDP and radial pulses. No C/C/E.  Skin - No rash.    LABS         ASSESSMENT/PLAN     Mery Krueger is a 69 y.o. female with  1. Right arm pain  No clear cause at this time  No suspicion for DVT so no US  No suspicion of fracture so not xray  Will monitor for now and if it persists will send to pain " management     2. Fatigue, unspecified type  -     CBC Auto Differential; Future; Expected date: 01/08/2025  -     TSH; Future; Expected date: 01/08/2025           WORRY SCORE 1    RTC in 6 months, sooner if needed and depending on labs.    Destiny Villalta MD  Board Certified Internist/Geriatrician  [facility] ([place])

## 2025-01-27 NOTE — PROGRESS NOTES
Subjective:      Patient ID: Mery Krueger is a 70 y.o. female.    Chief Complaint: Disease Management    HPI    Rheumatologic History:      - Diagnosis/es:              - Psoriasis diagnosed in  by Dr Fernandez              - Psoriatic arthritis: diagnosed 2024 on the basis of personal history of psoriasis, negative RF, and x-ray changes suggestive of psoriatic arthritis)              - Erosive osteoarthritis              - Osteopenia with elevated FRAX not on therapy per patient preference  - Family History: No autoimmune conditions  - Obstetric History:  (no complications and born full term)   - Positive serologies:  COLLIN 1:40 dense fine speckled, smooth muscle (1:40)  - Negative serologies:  RF, CCP, SSA, SSB, dsDNA  - Infectious screening labs: Negative T spot (2023)   - Imaging:              - DEXA (10/2023) osteopenia with 34% risk of a major osteoporotic fracture and a 4.5% risk of hip fracture in the next 10 years (FRAX).               - Xray left ankle (2015) calcaneal enthesophyte              - Xray right foot (10/2021) There is cortical irregularity noted at the right 2nd DIP joint, possibly the result of previous trauma or infection.  Please correlate clinically.  There is soft tissue swelling about the right 2nd toe.                 - Xray left elbow (24) Interval development of a large olecranon spur is noted as can be seen with calcific tendinopathy or tug type injury at the triceps insertion upon the olecranon.  Soft tissue swelling is noted anterior to the spur that could relate to acute bursal irritation or tendinopathy.  Epicondylar spurring is noted along the lateral humeral epicondyle as can be seen with history of epicondylitis.  No obvious anterior elbow joint effusion is noted.  A small sclerotic focus is noted along radius head suggestive a bone island or bone infarct of 5 mm new since prior.               - Xray arthritis survey (24) Multifocal degenerative  "changes noted as described above. Distal interphalangeal changes are noted as can be seen with erosive osteoarthropathy or inflammatory arthropathies such as psoriasis. Please clinically correlate.   - Previous Treatments:              - Topical meds              - Otezla: approved but never tried due to cost  - Current Treatments:   - Tremfya (took only 1 dose 2022, insurance would not cover it; in 8/2023 she took 2 shots which cleared it up again)  Initial History:   This is a 69-year-old woman with history of HLD, CAD s/p stent placement x 3 (2018), GERD, allergic rhinitis, right ankle fracture s/p repair (1998) osteopenia with elevated FRAX not on medication, right CTS, and psoriasis on Tremfya who was last seen by Dr Viramontes in 2/2021 and subsequently lost to follow up. She reports migratory arthralgia that began many years ago before the onset of her psoriasis. She reports pain in her left knee and left elbow and occasionally both hips, as well as hair thinning. Initially, she took 1 dose of sample Tremfya in 2022 which cleared up her rashes and helped with her joint pain.  Insurance would not cover Tremfya as maintenance medication, so subsequently she went off DMARD therapy until August 20, 2023 when she took another 2 sample shots which did clear up her rashes, but did not help with her arthralgia.  I do not find active inflammatory arthritis, nail dystrophy, or psoriasis on exam today. She does not meet CASPAR criteria at this time, but I discussed that some of her symptoms are suspicious for psoriatic arthritis.  Dermatology may consider starting her on a maintenance medication that is FDA approved for both psoriasis and psoriatic arthritis such as Otezla for example for now.    Interval History:   She reports intermittent pain in her elbows, knees, and ankles. Her psoriasis is controlled.      Objective:   /85 (BP Location: Left arm, Patient Position: Sitting)   Pulse 79   Ht 5' 3" (1.6 m)   Wt " 66.1 kg (145 lb 11.6 oz)   BMI 25.81 kg/m²   Physical Exam   Constitutional: normal appearance.   HENT:   Head: Normocephalic and atraumatic.   Pulmonary/Chest: Effort normal.   Musculoskeletal:      Comments: No synovitis, dactylitis, enthesitis, effusions     Neurological: She is alert.   Skin: Skin is warm and dry. No rash noted.   No skin thickening, telangiectasias, calcinosis, psoriasiform lesions, lupoid lesions       No data to display     Assessment:     1. Psoriasis    2. Psoriatic arthritis    3. Medication monitoring encounter      This is a 70-year-old woman with history of HLD, CAD s/p stent placement x 3 (2018), GERD, allergic rhinitis, right ankle fracture s/p repair (1998) osteopenia with elevated FRAX not on medication per patient preference, right CTS, psoriasis, and psoriatic arthritis. Otezla was approved but was too expensive. Saw dermatology and plans to take Tremfya once a year when her psoriasis flares which seems to be sufficient for her skin. I discussed that this type of intermittent dosing has not been studied in [facility] and there is risk of joint damage that I would not be able to reverse. Her psoriasis is controlled, but she reports intermittent pain in her elbows, knees, and ankles. I suspect enthesitis and  recommended Humira. She will consider this.    Plan:     Problem List Items Addressed This Visit          Derm    Psoriasis - Primary    Relevant Orders    C-Reactive Protein    CBC Auto Differential    Creatinine, Serum    ALT (SGPT)    AST (SGOT)    Sedimentation rate       Orthopedic    Psoriatic arthritis    Relevant Orders    C-Reactive Protein    CBC Auto Differential    Creatinine, Serum    ALT (SGPT)    AST (SGOT)    Sedimentation rate     Other Visit Diagnoses       Medication monitoring encounter        Relevant Orders    Hepatitis B surface antigen    HBcAB    Hepatitis B surface antibody    Hepatitis C antibody    Quantiferon Gold TB    C-Reactive Protein    CBC Auto  Differential    Creatinine, Serum    ALT (SGPT)    AST (SGOT)    Sedimentation rate          - I discussed potential side effects of Humira including injection site reactions, malignancy, infection, blood count, liver and kidney function abnormalities.  Hold for infection. ACR patient information sheet on TNF inhibitors was provided.  - If she decides to go ahead and start Humira, she will let me know and will need pre-DMARD labs    Follow up in 3-4 months    30 minutes of total time spent on the encounter, which includes face to face time and non-face to face time preparing to see the patient (eg, review of tests), Obtaining and/or reviewing separately obtained history, Documenting clinical information in the electronic or other health record, Independently interpreting results (not separately reported) and communicating results to the patient/family/caregiver, or Care coordination (not separately reported).     This note was prepared with [employer] Direct voice recognition transcription software. Garbled syntax, mangled pronouns, and other bizarre constructions may be attributed to that software system       Jessica Fernandez M.D.  Rheumatology Dept  [place], [place]

## 2025-01-28 ENCOUNTER — OFFICE VISIT (OUTPATIENT)
Dept: RHEUMATOLOGY | Facility: CLINIC | Age: 70
End: 2025-01-28
Payer: MEDICARE

## 2025-01-28 VITALS
WEIGHT: 145.75 LBS | DIASTOLIC BLOOD PRESSURE: 85 MMHG | BODY MASS INDEX: 25.82 KG/M2 | SYSTOLIC BLOOD PRESSURE: 128 MMHG | HEIGHT: 63 IN | HEART RATE: 79 BPM

## 2025-01-28 DIAGNOSIS — L40.9 PSORIASIS: Primary | ICD-10-CM

## 2025-01-28 DIAGNOSIS — L40.50 PSORIATIC ARTHRITIS: ICD-10-CM

## 2025-01-28 DIAGNOSIS — Z51.81 MEDICATION MONITORING ENCOUNTER: ICD-10-CM

## 2025-01-28 PROCEDURE — 99214 OFFICE O/P EST MOD 30 MIN: CPT | Mod: PBBFAC,PO | Performed by: STUDENT IN AN ORGANIZED HEALTH CARE EDUCATION/TRAINING PROGRAM

## 2025-01-28 PROCEDURE — 99999 PR PBB SHADOW E&M-EST. PATIENT-LVL IV: CPT | Mod: PBBFAC,,, | Performed by: STUDENT IN AN ORGANIZED HEALTH CARE EDUCATION/TRAINING PROGRAM

## 2025-01-28 PROCEDURE — 99214 OFFICE O/P EST MOD 30 MIN: CPT | Mod: S$PBB,,, | Performed by: STUDENT IN AN ORGANIZED HEALTH CARE EDUCATION/TRAINING PROGRAM

## 2025-01-28 ASSESSMENT — ROUTINE ASSESSMENT OF PATIENT INDEX DATA (RAPID3)
MDHAQ FUNCTION SCORE: 0
PAIN SCORE: 1.5
FATIGUE SCORE: 1.1
PATIENT GLOBAL ASSESSMENT SCORE: 0
TOTAL RAPID3 SCORE: 0.5
PSYCHOLOGICAL DISTRESS SCORE: 0

## 2025-01-30 DIAGNOSIS — Z00.00 ENCOUNTER FOR MEDICARE ANNUAL WELLNESS EXAM: ICD-10-CM

## 2025-05-15 ENCOUNTER — OFFICE VISIT (OUTPATIENT)
Dept: PRIMARY CARE CLINIC | Facility: CLINIC | Age: 70
End: 2025-05-15
Payer: MEDICARE

## 2025-05-15 VITALS
OXYGEN SATURATION: 97 % | HEIGHT: 63 IN | DIASTOLIC BLOOD PRESSURE: 68 MMHG | WEIGHT: 146.63 LBS | BODY MASS INDEX: 25.98 KG/M2 | TEMPERATURE: 98 F | SYSTOLIC BLOOD PRESSURE: 118 MMHG | HEART RATE: 72 BPM

## 2025-05-15 DIAGNOSIS — E53.8 VITAMIN B12 DEFICIENCY: ICD-10-CM

## 2025-05-15 DIAGNOSIS — K21.9 GASTROESOPHAGEAL REFLUX DISEASE, UNSPECIFIED WHETHER ESOPHAGITIS PRESENT: ICD-10-CM

## 2025-05-15 DIAGNOSIS — I25.84 CORONARY ARTERY DISEASE DUE TO CALCIFIED CORONARY LESION: Primary | ICD-10-CM

## 2025-05-15 DIAGNOSIS — I25.10 CORONARY ARTERY DISEASE DUE TO CALCIFIED CORONARY LESION: Primary | ICD-10-CM

## 2025-05-15 DIAGNOSIS — J30.9 ALLERGIC RHINITIS, UNSPECIFIED SEASONALITY, UNSPECIFIED TRIGGER: ICD-10-CM

## 2025-05-15 DIAGNOSIS — R10.31 RIGHT LOWER QUADRANT ABDOMINAL PAIN: ICD-10-CM

## 2025-05-15 DIAGNOSIS — R13.10 DYSPHAGIA, UNSPECIFIED TYPE: ICD-10-CM

## 2025-05-15 DIAGNOSIS — J45.20 MILD INTERMITTENT ASTHMA WITHOUT COMPLICATION: ICD-10-CM

## 2025-05-15 DIAGNOSIS — E78.00 PURE HYPERCHOLESTEROLEMIA: ICD-10-CM

## 2025-05-15 PROCEDURE — 99214 OFFICE O/P EST MOD 30 MIN: CPT | Mod: S$PBB,,, | Performed by: INTERNAL MEDICINE

## 2025-05-15 PROCEDURE — 99215 OFFICE O/P EST HI 40 MIN: CPT | Mod: PBBFAC,PN | Performed by: INTERNAL MEDICINE

## 2025-05-15 PROCEDURE — 99999 PR PBB SHADOW E&M-EST. PATIENT-LVL V: CPT | Mod: PBBFAC,,, | Performed by: INTERNAL MEDICINE

## 2025-05-15 NOTE — PROGRESS NOTES
"INTERNAL MEDICINE PROGRESS/URGENT CARE NOTE    CHIEF COMPLAINT     No chief complaint on file.      HPI     Mery Krueger is a very jovi 70 y.o.  female who presents with a PMHx of  asthma, history of MI, GERD, HLD, hypothyroid, arthritis, CAD, who presents today for routine 6-month follow up visit.   Within the past few years or so, her    after suffering an MI in his sleep. She has been spending a great deal of time with her daughter who just had a knee surgery. She has been very busy with that.   She has done luciano well with coping  From a health standpoint, she has been doig well otherwise, with not much notable medical complaints.   No chest pain. Adequate follow up for the CAD with stents.      Her main issues and complaits today are:    RLQ abdominal pain. For the past 2 weeks. On and off. Not associated with constipation. Mainly at  night when she tries to sleep. Positional ie when she lays on that side   Interested in weaning off the lexapro. Was started when she was caring for her    Dysphagia. Her daughter noticed. She admits she "feels like I'm choking" .      At her previous visit we discussed   Hair loss: recently saw a dermatologist who started her on nutrofol. As well as recommended biotin.   Excessive sweating. Says shes normally a sweater but it seems to have worsened. No other hormonal symptoms. No symptoms consistent with thyroid disorder and it had been checked less than 6 months ago and was wnl.   Dermatologist diagnosed her with psociatic arthritis and started her on a an immunologic medication which shes says helped with her psoriasis but did not help wi th her  joint pains, thinks the recent shot may have even worsened this.        Chronic isomnia. She worries and thinks a lot. We discussed increasing the dose of lexapro today      CAD/History of NSTEMI with stents: on zetia. On plavix and crestor 20mg daily.   Asymptomatic  Regarding diet and exercise, she reports " "she does exercise consistently. Not less active   Follows closely with a cardiologist.   LDL is not at goal. Today recommend increasing dose of crestor        Elevated liver enzymes: likely du eto statin use. US liver was wnl. She says cardiology had stopped her zetia and it does appear like LFTs had improved. She thinks its due to the TREMFYA.         Home Medications:  Prior to Admission medications    Medication Sig Start Date End Date Taking? Authorizing Provider   albuterol (VENTOLIN HFA) 90 mcg/actuation inhaler Inhale 2 puffs into the lungs every 6 (six) hours as needed for Wheezing. Rescue 5/17/22   Sabi Powell, NP   alcohol swabs PadM Clean site using a circular motion working outward. 5/14/24   Destiny Villalta MD   azelastine (ASTELIN) 137 mcg (0.1 %) nasal spray 1 SPRAY (137 MCG TOTAL) BY NASAL ROUTE 2 (TWO) TIMES DAILY AS NEEDED (ALLERGY). 8/1/22   Yesi Salgado NP   cholecalciferol, vitamin D3, (VITAMIN D3) 125 mcg (5,000 unit) Tab Take 5,000 Units by mouth once daily.    Provider, Historical   clopidogreL (PLAVIX) 75 mg tablet TAKE 1 TABLET BY MOUTH EVERY DAY 6/29/22   Charo Wright NP   cyanocobalamin 1,000 mcg/mL injection INJECT 1 ML INTO THE SKIN EVERY 30 DAYS 8/2/24   Destiny Villalta MD   EScitalopram oxalate (LEXAPRO) 10 MG tablet TAKE 1 TABLET BY MOUTH EVERY DAY IN THE EVENING 9/25/24   Destiny Villalta MD   famotidine (PEPCID) 20 MG tablet Take 20 mg by mouth 2 (two) times daily.    Provider, Historical   fluticasone propionate (FLONASE) 50 mcg/actuation nasal spray Spray 2 sprays as needed by nasal route for 90 days.    Provider, Historical   insulin syringe-needle U-100 (INSULIN SYRINGE MICROFINE) 1 mL 27 gauge x 5/8" Syrg Inject 1mL subcutaneously every 30 days for a total of three doses. 5/14/24   Destiny Villalta MD   levothyroxine (SYNTHROID) 25 MCG tablet TAKE 1 TABLET BY MOUTH EVERY DAY BEFORE BREAKFAST 11/26/24   Destiny Villalta MD   liver " "extract (LIVER ORAL) Take by mouth.    Provider, Historical   nebulizer and compressor (COMP-AIR NEBULIZER COMPRESSOR) Hiral Use as directed 5/17/22   Sabi Powell NP   pantoprazole (PROTONIX) 40 MG tablet Take 40 mg by mouth once daily.    Provider, Historical   rosuvastatin (CRESTOR) 20 MG tablet TAKE 1 TABLET BY MOUTH EVERY DAY IN THE EVENING 4/28/22   Charo Wright NP   tildrakizumab-asmn (ILUMYA) 100 mg/mL subcutaneous injection Inject 1 sq at week 0, then week 4, and then every 12 weeks thereafter  Patient taking differently: Inject into the skin as needed (Psoriatic arthritis). Inject 1 sq at week 0, then week 4, and then every 12 weeks thereafter 6/12/24   Shnanon Newby MD         PHYSICAL EXAM     /68 (BP Location: Right arm, Patient Position: Sitting)   Pulse 72   Temp 98.1 °F (36.7 °C)   Ht 5' 3" (1.6 m)   Wt 66.5 kg (146 lb 9.7 oz)   SpO2 97%   BMI 25.97 kg/m²     GEN - A+OX4, NAD   HEENT - PERRL, EOMI, OP clear  Neck - No thyromegaly or cervical LAD. No thyroid masses felt.  CV - RRR, no m/r   Chest - CTAB, no wheezing or rhonchi  Abd - S/NT/ND/+BS.   Ext - 2+BDP and radial pulses. No C/C/E.  Skin - No rash.    LABS         ASSESSMENT/PLAN     Mery Kureger is a 70 y.o. female with  1. Coronary artery disease, Stents LAD 12/2018 [place] h/o acute stent thrombosis.  Overview:  LAD stent. 3 stents  Follows with cardiology. Scheduled for her cardiac testing.   On statin and plavix   Advised doing more cardio      2. Vitamin B12 deficiency  Continue with supplement     3. Pure hypercholesterolemia  Overview:  Lipid panel well controlled   Continue with statin. And zetia  Continue with cardiology.   Limit cholesterol and sweets in diet. Advised healthy eating out      4. Mild intermittent asthma without complicatio  Overview:  Controlled . Prn albuterol  Avoid triggers and allergens      5. Gastroesophageal reflux disease, unspecified whether esophagitis present  Overview:  S/p " recent EGD with gastritic, duodenal ulcers.   On protonix and better  Discussed lifestyle changes and foods to limit       6. Allergic rhinitis, unspecified seasonality, unspecified trigger  Overview:  Very well controlled  On azelastin  Avoid allergens      7. Right lower quadrant abdominal pain  -     Ambulatory Referral/Consult to Physical Therapy    8. Dysphagia, unspecified type  -     Fl Modified Barium Swallow Speech; Future; Expected date: 05/15/2025  -     SLP video swallow; Future; Expected date: 05/15/2025           WORRY SCORE 1    RTC in 6 months, sooner if needed and depending on labs.    Destiny Villalta MD  Board Certified Internist/Geriatrician  [facility] ([place])

## 2025-05-19 ENCOUNTER — HOSPITAL ENCOUNTER (OUTPATIENT)
Dept: RADIOLOGY | Facility: HOSPITAL | Age: 70
Discharge: HOME | End: 2025-05-19
Attending: INTERNAL MEDICINE
Payer: MEDICARE

## 2025-05-19 ENCOUNTER — RESULTS FOLLOW-UP (OUTPATIENT)
Dept: PRIMARY CARE CLINIC | Facility: CLINIC | Age: 70
End: 2025-05-19
Payer: MEDICARE

## 2025-05-19 DIAGNOSIS — R10.31 RIGHT LOWER QUADRANT ABDOMINAL PAIN: ICD-10-CM

## 2025-05-19 DIAGNOSIS — R13.19 ESOPHAGEAL DYSPHAGIA: Primary | ICD-10-CM

## 2025-05-19 PROCEDURE — 76775 US EXAM ABDO BACK WALL LIM: CPT | Mod: TC,PO

## 2025-05-19 PROCEDURE — 76775 US EXAM ABDO BACK WALL LIM: CPT | Mod: 26,,, | Performed by: RADIOLOGY

## 2025-05-19 NOTE — PROGRESS NOTES
Abdominal US showed no abnormal finding. At this time, id say we monitor the pain. If it continues, could send for a CT. Message me in a few weeks with an update on the pain.

## 2025-05-22 NOTE — PROGRESS NOTES
I've also ordered a lab and would like her to have It done at her convenience ie its not urgent but could rule out an autoimmune cause of dysphagia.

## 2025-05-22 NOTE — PROGRESS NOTES
The swallow test she had done showed within normal limits with no evidence of aspiration. There was some mild retention of contrast seen in the upper part of the esophagus. Would consider an EGD if symptoms persist.

## 2025-05-27 NOTE — PROGRESS NOTES
Subjective:      Patient ID: Mery Krueger is a 70 y.o. female.    Chief Complaint: Disease Management    HPI    Rheumatologic History:      - Diagnosis/es:              - Psoriasis diagnosed in  by Dr Fernandez              - Psoriatic arthritis: diagnosed 2024 on the basis of personal history of psoriasis, negative RF, and x-ray changes suggestive of psoriatic arthritis)              - Erosive osteoarthritis              - Osteopenia with elevated FRAX not on therapy per patient preference  - Family History: No autoimmune conditions  - Obstetric History:  (no complications and born full term)   - Positive serologies:  COLLIN 1:40 dense fine speckled, smooth muscle (1:40)  - Negative serologies:  RF, CCP, SSA, SSB, dsDNA  - Infectious screening labs: Negative T spot (2023)   - Imaging:              - DEXA (10/2023) osteopenia with 34% risk of a major osteoporotic fracture and a 4.5% risk of hip fracture in the next 10 years (FRAX).               - Xray left ankle (2015) calcaneal enthesophyte              - Xray right foot (10/2021) There is cortical irregularity noted at the right 2nd DIP joint, possibly the result of previous trauma or infection.  Please correlate clinically.  There is soft tissue swelling about the right 2nd toe.                 - Xray left elbow (24) Interval development of a large olecranon spur is noted as can be seen with calcific tendinopathy or tug type injury at the triceps insertion upon the olecranon.  Soft tissue swelling is noted anterior to the spur that could relate to acute bursal irritation or tendinopathy.  Epicondylar spurring is noted along the lateral humeral epicondyle as can be seen with history of epicondylitis.  No obvious anterior elbow joint effusion is noted.  A small sclerotic focus is noted along radius head suggestive a bone island or bone infarct of 5 mm new since prior.               - Xray arthritis survey (24) Multifocal degenerative  "changes noted as described above. Distal interphalangeal changes are noted as can be seen with erosive osteoarthropathy or inflammatory arthropathies such as psoriasis. Please clinically correlate.   - Previous Treatments:              - Topical meds              - Otezla: approved but never tried due to cost  - Current Treatments:   - Tremfya (took only 1 dose 2022, insurance would not cover it; in 8/2023 she took 2 shots which cleared it up again)  Initial History:   This is a 69-year-old woman with history of HLD, CAD s/p stent placement x 3 (2018), GERD, allergic rhinitis, right ankle fracture s/p repair (1998) osteopenia with elevated FRAX not on medication, right CTS, and psoriasis on Tremfya who was last seen by Dr Viramontes in 2/2021 and subsequently lost to follow up. She reports migratory arthralgia that began many years ago before the onset of her psoriasis. She reports pain in her left knee and left elbow and occasionally both hips, as well as hair thinning. Initially, she took 1 dose of sample Tremfya in 2022 which cleared up her rashes and helped with her joint pain.  Insurance would not cover Tremfya as maintenance medication, so subsequently she went off DMARD therapy until August 20, 2023 when she took another 2 sample shots which did clear up her rashes, but did not help with her arthralgia.  I do not find active inflammatory arthritis, nail dystrophy, or psoriasis on exam today. She does not meet CASPAR criteria at this time, but I discussed that some of her symptoms are suspicious for psoriatic arthritis.  Dermatology may consider starting her on a maintenance medication that is FDA approved for both psoriasis and psoriatic arthritis such as Otezla for example for now.     Interval History:   She denies joint pain and swelling. She has a few small psoriasiform lesions on the left calf but is otherwise asymptomatic.     Objective:   /71   Pulse 72   Ht 5' 3" (1.6 m)   Wt 65 kg (143 lb 4.8 " oz)   BMI 25.38 kg/m²   Physical Exam   Constitutional: normal appearance.   HENT:   Head: Normocephalic and atraumatic.   Pulmonary/Chest: Effort normal.   Musculoskeletal:      Comments: No synovitis, dactylitis, enthesitis, effusions     Neurological: She is alert.   Skin: Skin is warm and dry.   Small psoriasiform lesions, left calf       No data to display     Assessment:     1. Psoriasis    2. Psoriatic arthritis      This is a 70-year-old woman with history of HLD, CAD s/p stent placement x 3 (2018), GERD, allergic rhinitis, right ankle fracture s/p repair (1998) osteopenia with elevated FRAX not on medication per patient preference, right CTS, psoriasis, and psoriatic arthritis. Otezla was approved but was too expensive. Saw dermatology and plans to take Tremfya once a year when her psoriasis flares which seems to be sufficient for her skin. At previous visit, I discussed that this type of intermittent dosing has not been studied in [facility] and there is risk of joint damage that I would not be able to reverse. Her psoriasis is controlled, but she reports intermittent pain in her elbows, knees, and ankles. I suspect enthesitis and  recommended Humira. She will consider this.     Plan:     Problem List Items Addressed This Visit          Derm    Psoriasis - Primary    Relevant Medications    clobetasoL (TEMOVATE) 0.05 % cream       Orthopedic    Psoriatic arthritis     - I discussed potential side effects of Humira including injection site reactions, malignancy, infection, blood count, liver and kidney function abnormalities.  Hold for infection. ACR patient information sheet on TNF inhibitors was provided.  - If she decides to go ahead and start Humira, she will let me know and will need pre-DMARD labs    Follow up in 1 year or sooner if needed    20 minutes of total time spent on the encounter, which includes face to face time and non-face to face time preparing to see the patient (eg, review of tests),  Obtaining and/or reviewing separately obtained history, Documenting clinical information in the electronic or other health record, Independently interpreting results (not separately reported) and communicating results to the patient/family/caregiver, or Care coordination (not separately reported).     This note was prepared with [employer] Direct voice recognition transcription software. Garbled syntax, mangled pronouns, and other bizarre constructions may be attributed to that software system       Jessica Fernandez M.D.  Rheumatology Dept  [place], [place]

## 2025-05-28 ENCOUNTER — OFFICE VISIT (OUTPATIENT)
Dept: RHEUMATOLOGY | Facility: CLINIC | Age: 70
End: 2025-05-28
Payer: MEDICARE

## 2025-05-28 VITALS
BODY MASS INDEX: 25.39 KG/M2 | DIASTOLIC BLOOD PRESSURE: 71 MMHG | HEART RATE: 72 BPM | WEIGHT: 143.31 LBS | HEIGHT: 63 IN | SYSTOLIC BLOOD PRESSURE: 115 MMHG

## 2025-05-28 DIAGNOSIS — L40.50 PSORIATIC ARTHRITIS: ICD-10-CM

## 2025-05-28 DIAGNOSIS — L40.9 PSORIASIS: Primary | ICD-10-CM

## 2025-05-28 PROCEDURE — 99999 PR PBB SHADOW E&M-EST. PATIENT-LVL IV: CPT | Mod: PBBFAC,,, | Performed by: STUDENT IN AN ORGANIZED HEALTH CARE EDUCATION/TRAINING PROGRAM

## 2025-05-28 PROCEDURE — 99214 OFFICE O/P EST MOD 30 MIN: CPT | Mod: PBBFAC,PO | Performed by: STUDENT IN AN ORGANIZED HEALTH CARE EDUCATION/TRAINING PROGRAM

## 2025-05-28 PROCEDURE — 99213 OFFICE O/P EST LOW 20 MIN: CPT | Mod: S$PBB,,, | Performed by: STUDENT IN AN ORGANIZED HEALTH CARE EDUCATION/TRAINING PROGRAM

## 2025-05-28 RX ORDER — CLOBETASOL PROPIONATE 0.5 MG/G
CREAM TOPICAL
Qty: 60 G | Refills: 2 | Status: SHIPPED | OUTPATIENT
Start: 2025-05-28

## 2025-05-28 ASSESSMENT — ROUTINE ASSESSMENT OF PATIENT INDEX DATA (RAPID3)
MDHAQ FUNCTION SCORE: 0
PSYCHOLOGICAL DISTRESS SCORE: 0
PATIENT GLOBAL ASSESSMENT SCORE: 0
PAIN SCORE: 0
FATIGUE SCORE: 1.1
TOTAL RAPID3 SCORE: 0

## 2025-08-13 DIAGNOSIS — F32.A DEPRESSION, UNSPECIFIED DEPRESSION TYPE: ICD-10-CM

## 2025-08-13 RX ORDER — ESCITALOPRAM OXALATE 10 MG/1
10 TABLET ORAL NIGHTLY
Qty: 90 TABLET | Refills: 3 | Status: SHIPPED | OUTPATIENT
Start: 2025-08-13